# Patient Record
Sex: FEMALE | Race: WHITE | NOT HISPANIC OR LATINO | URBAN - METROPOLITAN AREA
[De-identification: names, ages, dates, MRNs, and addresses within clinical notes are randomized per-mention and may not be internally consistent; named-entity substitution may affect disease eponyms.]

---

## 2024-10-15 ENCOUNTER — LAB (OUTPATIENT)
Dept: LAB | Facility: HOSPITAL | Age: 19
End: 2024-10-15
Payer: OTHER GOVERNMENT

## 2024-10-15 ENCOUNTER — TRANSCRIBE ORDERS (OUTPATIENT)
Dept: LAB | Facility: HOSPITAL | Age: 19
End: 2024-10-15
Payer: OTHER GOVERNMENT

## 2024-10-15 DIAGNOSIS — Z34.82 PRENATAL CARE, SUBSEQUENT PREGNANCY, SECOND TRIMESTER: Primary | ICD-10-CM

## 2024-10-15 DIAGNOSIS — Z34.82 PRENATAL CARE, SUBSEQUENT PREGNANCY, SECOND TRIMESTER: ICD-10-CM

## 2024-10-15 LAB
BLD GP AB SCN SERPL QL: NEGATIVE
DEPRECATED RDW RBC AUTO: 39.9 FL (ref 37–54)
ERYTHROCYTE [DISTWIDTH] IN BLOOD BY AUTOMATED COUNT: 13.6 % (ref 12.3–15.4)
GLUCOSE 1H P 100 G GLC PO SERPL-MCNC: 143 MG/DL (ref 65–139)
HCT VFR BLD AUTO: 29.6 % (ref 34–46.6)
HGB BLD-MCNC: 9.7 G/DL (ref 12–15.9)
MCH RBC QN AUTO: 26.9 PG (ref 26.6–33)
MCHC RBC AUTO-ENTMCNC: 32.8 G/DL (ref 31.5–35.7)
MCV RBC AUTO: 82 FL (ref 79–97)
PLATELET # BLD AUTO: 267 10*3/MM3 (ref 140–450)
PMV BLD AUTO: 11.6 FL (ref 6–12)
RBC # BLD AUTO: 3.61 10*6/MM3 (ref 3.77–5.28)
TREPONEMA PALLIDUM IGG+IGM AB [PRESENCE] IN SERUM OR PLASMA BY IMMUNOASSAY: NORMAL
WBC NRBC COR # BLD AUTO: 9.35 10*3/MM3 (ref 3.4–10.8)

## 2024-10-15 PROCEDURE — 36415 COLL VENOUS BLD VENIPUNCTURE: CPT

## 2024-10-15 PROCEDURE — 82950 GLUCOSE TEST: CPT

## 2024-10-15 PROCEDURE — 82948 REAGENT STRIP/BLOOD GLUCOSE: CPT

## 2024-10-15 PROCEDURE — 86850 RBC ANTIBODY SCREEN: CPT

## 2024-10-15 PROCEDURE — 86780 TREPONEMA PALLIDUM: CPT

## 2024-10-15 PROCEDURE — 85027 COMPLETE CBC AUTOMATED: CPT

## 2024-10-18 ENCOUNTER — TRANSCRIBE ORDERS (OUTPATIENT)
Dept: LAB | Facility: HOSPITAL | Age: 19
End: 2024-10-18
Payer: OTHER GOVERNMENT

## 2024-10-18 ENCOUNTER — LAB (OUTPATIENT)
Dept: LAB | Facility: HOSPITAL | Age: 19
End: 2024-10-18
Payer: OTHER GOVERNMENT

## 2024-10-18 DIAGNOSIS — Z34.93 THIRD TRIMESTER PREGNANCY: Primary | ICD-10-CM

## 2024-10-18 DIAGNOSIS — Z34.93 THIRD TRIMESTER PREGNANCY: ICD-10-CM

## 2024-10-18 DIAGNOSIS — Z3A.29 29 WEEKS GESTATION OF PREGNANCY: ICD-10-CM

## 2024-10-18 LAB
GLUCOSE 1H P 100 G GLC PO SERPL-MCNC: 179 MG/DL (ref 74–180)
GLUCOSE 2H P 100 G GLC PO SERPL-MCNC: 138 MG/DL (ref 74–155)
GLUCOSE 3H P 100 G GLC PO SERPL-MCNC: 116 MG/DL (ref 74–140)
GLUCOSE P FAST SERPL-MCNC: 85 MG/DL (ref 74–106)

## 2024-10-18 PROCEDURE — 82951 GLUCOSE TOLERANCE TEST (GTT): CPT

## 2024-10-18 PROCEDURE — 36415 COLL VENOUS BLD VENIPUNCTURE: CPT

## 2024-10-18 PROCEDURE — 82948 REAGENT STRIP/BLOOD GLUCOSE: CPT

## 2024-10-18 PROCEDURE — 82952 GTT-ADDED SAMPLES: CPT

## 2024-10-29 ENCOUNTER — TRANSCRIBE ORDERS (OUTPATIENT)
Dept: LAB | Facility: HOSPITAL | Age: 19
End: 2024-10-29
Payer: OTHER GOVERNMENT

## 2024-10-29 ENCOUNTER — LAB (OUTPATIENT)
Dept: LAB | Facility: HOSPITAL | Age: 19
End: 2024-10-29
Payer: OTHER GOVERNMENT

## 2024-10-29 DIAGNOSIS — Z34.83 PRENATAL CARE, SUBSEQUENT PREGNANCY, THIRD TRIMESTER: ICD-10-CM

## 2024-10-29 DIAGNOSIS — Z3A.30 30 WEEKS GESTATION OF PREGNANCY: ICD-10-CM

## 2024-10-29 DIAGNOSIS — Z3A.30 30 WEEKS GESTATION OF PREGNANCY: Primary | ICD-10-CM

## 2024-10-29 LAB
ALBUMIN SERPL-MCNC: 3.9 G/DL (ref 3.5–5.2)
ALBUMIN/GLOB SERPL: 1.4 G/DL
ALP SERPL-CCNC: 81 U/L (ref 39–117)
ALT SERPL W P-5'-P-CCNC: 8 U/L (ref 1–33)
ANION GAP SERPL CALCULATED.3IONS-SCNC: 13 MMOL/L (ref 5–15)
AST SERPL-CCNC: 12 U/L (ref 1–32)
BILIRUB SERPL-MCNC: <0.2 MG/DL (ref 0–1.2)
BUN SERPL-MCNC: 5 MG/DL (ref 6–20)
BUN/CREAT SERPL: 10.4 (ref 7–25)
CALCIUM SPEC-SCNC: 9.3 MG/DL (ref 8.6–10.5)
CHLORIDE SERPL-SCNC: 103 MMOL/L (ref 98–107)
CO2 SERPL-SCNC: 22 MMOL/L (ref 22–29)
CREAT SERPL-MCNC: 0.48 MG/DL (ref 0.57–1)
DEPRECATED RDW RBC AUTO: 42.9 FL (ref 37–54)
EGFRCR SERPLBLD CKD-EPI 2021: 138.4 ML/MIN/1.73
ERYTHROCYTE [DISTWIDTH] IN BLOOD BY AUTOMATED COUNT: 14.4 % (ref 12.3–15.4)
GLOBULIN UR ELPH-MCNC: 2.7 GM/DL
GLUCOSE SERPL-MCNC: 86 MG/DL (ref 65–99)
HCT VFR BLD AUTO: 30.4 % (ref 34–46.6)
HGB BLD-MCNC: 9.7 G/DL (ref 12–15.9)
LDH SERPL-CCNC: 194 U/L (ref 135–214)
MCH RBC QN AUTO: 26.4 PG (ref 26.6–33)
MCHC RBC AUTO-ENTMCNC: 31.9 G/DL (ref 31.5–35.7)
MCV RBC AUTO: 82.6 FL (ref 79–97)
PLATELET # BLD AUTO: 259 10*3/MM3 (ref 140–450)
PMV BLD AUTO: 11.8 FL (ref 6–12)
POTASSIUM SERPL-SCNC: 2.9 MMOL/L (ref 3.5–5.2)
PROT SERPL-MCNC: 6.6 G/DL (ref 6–8.5)
RBC # BLD AUTO: 3.68 10*6/MM3 (ref 3.77–5.28)
SODIUM SERPL-SCNC: 138 MMOL/L (ref 136–145)
URATE SERPL-MCNC: 3.6 MG/DL (ref 2.4–5.7)
WBC NRBC COR # BLD AUTO: 11.48 10*3/MM3 (ref 3.4–10.8)

## 2024-10-29 PROCEDURE — 80053 COMPREHEN METABOLIC PANEL: CPT

## 2024-10-29 PROCEDURE — 85027 COMPLETE CBC AUTOMATED: CPT

## 2024-10-29 PROCEDURE — 83615 LACTATE (LD) (LDH) ENZYME: CPT

## 2024-10-29 PROCEDURE — 84156 ASSAY OF PROTEIN URINE: CPT

## 2024-10-29 PROCEDURE — 82570 ASSAY OF URINE CREATININE: CPT

## 2024-10-29 PROCEDURE — 84550 ASSAY OF BLOOD/URIC ACID: CPT

## 2024-10-29 PROCEDURE — 36415 COLL VENOUS BLD VENIPUNCTURE: CPT

## 2024-10-30 LAB
CREAT UR-MCNC: 194.6 MG/DL
PROT ?TM UR-MCNC: 19.6 MG/DL

## 2024-10-31 ENCOUNTER — HOSPITAL ENCOUNTER (OUTPATIENT)
Facility: HOSPITAL | Age: 19
Discharge: HOME | End: 2024-10-31
Attending: ADVANCED PRACTICE MIDWIFE | Admitting: ADVANCED PRACTICE MIDWIFE
Payer: OTHER GOVERNMENT

## 2024-10-31 VITALS
TEMPERATURE: 98.7 F | DIASTOLIC BLOOD PRESSURE: 80 MMHG | BODY MASS INDEX: 32.96 KG/M2 | WEIGHT: 186 LBS | HEIGHT: 63 IN | SYSTOLIC BLOOD PRESSURE: 127 MMHG

## 2024-10-31 LAB
ALBUMIN SERPL-MCNC: 3.8 G/DL (ref 3.5–5.2)
ALBUMIN/GLOB SERPL: 1.3 G/DL
ALP SERPL-CCNC: 77 U/L (ref 39–117)
ALT SERPL W P-5'-P-CCNC: 7 U/L (ref 1–33)
ANION GAP SERPL CALCULATED.3IONS-SCNC: 12 MMOL/L (ref 5–15)
AST SERPL-CCNC: 12 U/L (ref 1–32)
BILIRUB SERPL-MCNC: 0.2 MG/DL (ref 0–1.2)
BUN SERPL-MCNC: 4 MG/DL (ref 6–20)
BUN/CREAT SERPL: 10.8 (ref 7–25)
CALCIUM SPEC-SCNC: 8.5 MG/DL (ref 8.6–10.5)
CHLORIDE SERPL-SCNC: 105 MMOL/L (ref 98–107)
CO2 SERPL-SCNC: 19 MMOL/L (ref 22–29)
CREAT SERPL-MCNC: 0.37 MG/DL (ref 0.57–1)
DEPRECATED RDW RBC AUTO: 46.4 FL (ref 37–54)
EGFRCR SERPLBLD CKD-EPI 2021: 147.4 ML/MIN/1.73
ERYTHROCYTE [DISTWIDTH] IN BLOOD BY AUTOMATED COUNT: 15.7 % (ref 12.3–15.4)
GLOBULIN UR ELPH-MCNC: 2.9 GM/DL
GLUCOSE SERPL-MCNC: 70 MG/DL (ref 65–99)
HCT VFR BLD AUTO: 33.6 % (ref 34–46.6)
HGB BLD-MCNC: 10.9 G/DL (ref 12–15.9)
MCH RBC QN AUTO: 26.7 PG (ref 26.6–33)
MCHC RBC AUTO-ENTMCNC: 32.4 G/DL (ref 31.5–35.7)
MCV RBC AUTO: 82.4 FL (ref 79–97)
PLATELET # BLD AUTO: 256 10*3/MM3 (ref 140–450)
PMV BLD AUTO: 11.4 FL (ref 6–12)
POTASSIUM SERPL-SCNC: 3.6 MMOL/L (ref 3.5–5.2)
PROT SERPL-MCNC: 6.7 G/DL (ref 6–8.5)
RBC # BLD AUTO: 4.08 10*6/MM3 (ref 3.77–5.28)
SODIUM SERPL-SCNC: 136 MMOL/L (ref 136–145)
URATE SERPL-MCNC: 3.1 MG/DL (ref 2.4–5.7)
WBC NRBC COR # BLD AUTO: 11.68 10*3/MM3 (ref 3.4–10.8)

## 2024-10-31 PROCEDURE — G0463 HOSPITAL OUTPT CLINIC VISIT: HCPCS

## 2024-10-31 PROCEDURE — 85027 COMPLETE CBC AUTOMATED: CPT | Performed by: ADVANCED PRACTICE MIDWIFE

## 2024-10-31 PROCEDURE — 80053 COMPREHEN METABOLIC PANEL: CPT | Performed by: ADVANCED PRACTICE MIDWIFE

## 2024-10-31 PROCEDURE — 59025 FETAL NON-STRESS TEST: CPT

## 2024-10-31 PROCEDURE — 84550 ASSAY OF BLOOD/URIC ACID: CPT | Performed by: ADVANCED PRACTICE MIDWIFE

## 2024-10-31 PROCEDURE — 36415 COLL VENOUS BLD VENIPUNCTURE: CPT | Performed by: ADVANCED PRACTICE MIDWIFE

## 2024-10-31 RX ORDER — FERROUS SULFATE 325(65) MG
325 TABLET ORAL
COMMUNITY

## 2024-10-31 RX ORDER — PRENATAL WITH FERROUS FUM AND FOLIC ACID 3080; 920; 120; 400; 22; 1.84; 3; 20; 10; 1; 12; 200; 27; 25; 2 [IU]/1; [IU]/1; MG/1; [IU]/1; MG/1; MG/1; MG/1; MG/1; MG/1; MG/1; UG/1; MG/1; MG/1; MG/1; MG/1
1 TABLET ORAL DAILY
COMMUNITY

## 2024-10-31 RX ORDER — BUSPIRONE HYDROCHLORIDE 10 MG/1
10 TABLET ORAL 3 TIMES DAILY
COMMUNITY

## 2024-10-31 NOTE — H&P
TAMMY Agarwal  Obstetric History and Physical    CC: BP evaluation    SUBJECTIVE:     Patient is a 19 y.o. female  currently at 30w4d, who presents from the office with multiple complaints.   Reports several days of having intermittent dizziness and pre-syncope. BP at home mildly elevated but normal in office. Was seen 10/29 with similar complaints also very anxious. Started on buspar at that visit.   Labs drawn 10/29 did show low potassium but she took the tablets for repletion already.   Denies HA, abdominal pain.   Episodes are intermittent, mostly when she is sitting down or changing positions       Prenatal Information:  Prenatal Results       Initial Prenatal Labs       Test Value Reference Range Date Time    Hemoglobin        Hematocrit        Platelets        Rubella IgG        Hepatitis B SAg        Hepatitis C Ab        RPR        T. Pallidum Ab   Non-Reactive  Non-Reactive 10/15/24 1526    ABO        Rh        Antibody Screen        HIV        Urine Culture        Gonorrhea        Chlamydia        TSH        HgB A1c         Varicella IgG        Hemoglobinopathy Fractionation        Hemoglobinopathy (genetic testing)        Cystic fibrosis                   Fetal testing        Test Value Reference Range Date Time    NIPT        MSAFP        AFP-4                  2nd and 3rd Trimester       Test Value Reference Range Date Time    Hemoglobin (repeated)  10.9 g/dL 12.0 - 15.9 10/31/24 1658       9.7 g/dL 12.0 - 15.9 10/29/24 1436       9.7 g/dL 12.0 - 15.9 10/15/24 1526    Hematocrit (repeated)  33.6 % 34.0 - 46.6 10/31/24 1658       30.4 % 34.0 - 46.6 10/29/24 1436       29.6 % 34.0 - 46.6 10/15/24 1526    Platelets   256 10*3/mm3 140 - 450 10/31/24 1658       259 10*3/mm3 140 - 450 10/29/24 1436       267 10*3/mm3 140 - 450 10/15/24 1526    1 hour GTT   179 mg/dL 74 - 180 10/18/24 1012       143 mg/dL 65 - 139 10/15/24 1526    Antibody Screen (repeated)  Negative   10/15/24 1526    3rd TM syphilis  scrn (repeated)  RPR         3rd TM syphilis scrn (repeated) TP-Ab  Non-Reactive  Non-Reactive 10/15/24 1526    3rd TM syphilis screen TB-Ab (FTA)  Non-Reactive  Non-Reactive 10/15/24 1526    Syphilis cascade test TP-Ab (EIA)        Syphilis cascade TPPA        GTT Fasting        GTT 1 Hr        GTT 2 Hr        GTT 3 Hr  116 mg/dL 74 - 140 10/18/24 1241    Group B Strep                  Other testing        Test Value Reference Range Date Time    Parvo IgG         CMV IgG                   Drug Screening       Test Value Reference Range Date Time    Amphetamine Screen        Barbiturate Screen        Benzodiazepine Screen        Methadone Screen        Phencyclidine Screen        Opiates Screen        THC Screen        Cocaine Screen        Propoxyphene Screen        Buprenorphine Screen        Methamphetamine Screen        Oxycodone Screen        Tricyclic Antidepressants Screen                  Legend    ^: Historical                          External Prenatal Results       Pregnancy Outside Results - Transcribed From Office Records - See Scanned Records For Details       Test Value Date Time    ABO       Rh       Antibody Screen  Negative  10/15/24 1526    Varicella IgG       Rubella       Hgb  10.9 g/dL 10/31/24 1658       9.7 g/dL 10/29/24 1436       9.7 g/dL 10/15/24 1526    Hct  33.6 % 10/31/24 1658       30.4 % 10/29/24 1436       29.6 % 10/15/24 1526    HgB A1c        1h GTT  179 mg/dL 10/18/24 1012       143 mg/dL 10/15/24 1526    3h GTT Fasting       3h GTT 1 hour       3h GTT 2 hour       3h GTT 3 hour   116 mg/dL 10/18/24 1241    Gonorrhea (discrete)       Chlamydia (discrete)       RPR       Syphils cascade: TP-Ab (FTA)  Non-Reactive  10/15/24 1526    TP-Ab  Non-Reactive  10/15/24 1526    TP-Ab (EIA)       TPPA       HBsAg       Herpes Simplex Virus PCR       Herpes Simplex VIrus Culture       HIV       Hep C RNA Quant PCR       Hep C Antibody       AFP       NIPT       Cystic Fibroisis        Group B  Strep       GBS Susceptibility to Clindamycin       GBS Susceptibility to Erythromycin       Fetal Fibronectin       Genetic Testing, Maternal Blood                 Drug Screening       Test Value Date Time    Urine Drug Screen       Amphetamine Screen       Barbiturate Screen       Benzodiazepine Screen       Methadone Screen       Phencyclidine Screen       Opiates Screen       THC Screen       Cocaine Screen       Propoxyphene Screen       Buprenorphine Screen       Methamphetamine Screen       Oxycodone Screen       Tricyclic Antidepressants Screen                 Legend    ^: Historical                             OB History:                     OB History    Para Term  AB Living   2 0 0 0 1 0   SAB IAB Ectopic Molar Multiple Live Births   1 0 0 0 0 0      # Outcome Date GA Lbr Jewel/2nd Weight Sex Type Anes PTL Lv   2 Current            1 SAB 2024 9w0d             Allergies:                      No Known Allergies   Past Medical History: Past Medical History:   Diagnosis Date    Anxiety       Past Surgical History No past surgical history on file.   Family History: No family history on file.   Social History:  reports that she quit smoking about 10 months ago. Her smoking use included cigarettes. She has never used smokeless tobacco.   reports that she does not currently use alcohol.   reports no history of drug use.    General ROS: Pertinent items are noted in HPI, all other systems reviewed and negative   Medications: Prenatal 27-1, busPIRone, and ferrous sulfate       Objective       Vital Signs Range for the last 24 hours  Temperature: Temp:  [98.7 °F (37.1 °C)] 98.7 °F (37.1 °C)   BP: BP: (126-137)/(84-87) 126/84   Pulse:     Respirations:     SPO2:                 Physical Examination: General appearance - alert, well appearing, and in no distress  Chest - clear to auscultation, no wheezes, rales or rhonchi, symmetric air entry  Heart - normal rate, regular rhythm, normal S1, S2, no  murmurs, rubs, clicks or gallops  Abdomen - Soft, gravid, nontender  Extremities - peripheral pulses normal, no pedal edema, no clubbing or cyanosis, no pedal edema noted      Fetal Heart Rate Assessment           Baseline: Fetal HR Baseline: normal range   Variability: Fetal HR Variability: moderate (amplitude range 6 to 25 bpm)   Accels: Fetal HR Accelerations: greater than/equal to 15 bpm, lasting at least 15 seconds   Decels: Fetal HR Decelerations: absent   Tracing Category:       Uterine Assessment   Method: Method: external tocotransducer   Frequency (min):     Ctx Count in 10 min:       Laboratory Results:  CBC:      Lab 10/31/24  1658 10/29/24  1436   WBC 11.68* 11.48*   HEMOGLOBIN 10.9* 9.7*   HEMATOCRIT 33.6* 30.4*   PLATELETS 256 259   MCV 82.4 82.6      CMP:        Lab 10/31/24  1658 10/29/24  1436   SODIUM 136 138   POTASSIUM 3.6 2.9*   CHLORIDE 105 103   CO2 19.0* 22.0   ANION GAP 12.0 13.0   BUN 4* 5*   CREATININE 0.37* 0.48*   EGFR 147.4 138.4   GLUCOSE 70 86   CALCIUM 8.5* 9.3   TOTAL PROTEIN 6.7 6.6   ALBUMIN 3.8 3.9   GLOBULIN 2.9 2.7   ALT (SGPT) 7 8   AST (SGOT) 12 12   BILIRUBIN 0.2 <0.2   ALK PHOS 77 81            Assessment/Plan:   IUP 30w4d     1.no evidence of pre-e or HTN  2.pre-syncope, dizziness  -likely related to orthostatic hypotension and changes in pregnancy physiology.   -BP normal in triage. Labs normal  -discussed hydration, hydration packets etc  3.Hypo K resolved.   4. FWB reassuring  5. Dc home with precautions. RTC as scheduled      Daiana Silva MD  10/31/2024  17:31 EDT

## 2025-01-01 RX ORDER — BUTORPHANOL TARTRATE 1 MG/ML
1 INJECTION, SOLUTION INTRAMUSCULAR; INTRAVENOUS
Status: CANCELLED | OUTPATIENT
Start: 2025-01-01

## 2025-01-01 RX ORDER — CARBOPROST TROMETHAMINE 250 UG/ML
250 INJECTION, SOLUTION INTRAMUSCULAR AS NEEDED
Status: CANCELLED | OUTPATIENT
Start: 2025-01-01

## 2025-01-01 RX ORDER — IBUPROFEN 600 MG/1
600 TABLET, FILM COATED ORAL EVERY 6 HOURS PRN
Status: CANCELLED | OUTPATIENT
Start: 2025-01-01

## 2025-01-01 RX ORDER — LIDOCAINE HYDROCHLORIDE 10 MG/ML
0.5 INJECTION, SOLUTION EPIDURAL; INFILTRATION; INTRACAUDAL; PERINEURAL ONCE AS NEEDED
Status: CANCELLED | OUTPATIENT
Start: 2025-01-01

## 2025-01-01 RX ORDER — ONDANSETRON 2 MG/ML
4 INJECTION INTRAMUSCULAR; INTRAVENOUS EVERY 6 HOURS PRN
Status: CANCELLED | OUTPATIENT
Start: 2025-01-01

## 2025-01-01 RX ORDER — OXYTOCIN/0.9 % SODIUM CHLORIDE 30/500 ML
250 PLASTIC BAG, INJECTION (ML) INTRAVENOUS CONTINUOUS
Status: CANCELLED | OUTPATIENT
Start: 2025-01-01 | End: 2025-01-01

## 2025-01-01 RX ORDER — BUTORPHANOL TARTRATE 2 MG/ML
2 INJECTION, SOLUTION INTRAMUSCULAR; INTRAVENOUS
Status: CANCELLED | OUTPATIENT
Start: 2025-01-01

## 2025-01-01 RX ORDER — DIPHENHYDRAMINE HYDROCHLORIDE 50 MG/ML
25 INJECTION INTRAMUSCULAR; INTRAVENOUS NIGHTLY PRN
Status: CANCELLED | OUTPATIENT
Start: 2025-01-01

## 2025-01-01 RX ORDER — ACETAMINOPHEN 325 MG/1
650 TABLET ORAL EVERY 4 HOURS PRN
Status: CANCELLED | OUTPATIENT
Start: 2025-01-01

## 2025-01-01 RX ORDER — SODIUM CHLORIDE, SODIUM LACTATE, POTASSIUM CHLORIDE, CALCIUM CHLORIDE 600; 310; 30; 20 MG/100ML; MG/100ML; MG/100ML; MG/100ML
75 INJECTION, SOLUTION INTRAVENOUS CONTINUOUS
Status: CANCELLED | OUTPATIENT
Start: 2025-01-01 | End: 2025-01-02

## 2025-01-01 RX ORDER — TERBUTALINE SULFATE 1 MG/ML
0.25 INJECTION, SOLUTION SUBCUTANEOUS AS NEEDED
Status: CANCELLED | OUTPATIENT
Start: 2025-01-01

## 2025-01-01 RX ORDER — SODIUM CHLORIDE 0.9 % (FLUSH) 0.9 %
10 SYRINGE (ML) INJECTION AS NEEDED
Status: CANCELLED | OUTPATIENT
Start: 2025-01-01

## 2025-01-01 RX ORDER — MISOPROSTOL 200 UG/1
800 TABLET ORAL AS NEEDED
Status: CANCELLED | OUTPATIENT
Start: 2025-01-01

## 2025-01-01 RX ORDER — FAMOTIDINE 20 MG/1
20 TABLET, FILM COATED ORAL EVERY 12 HOURS PRN
Status: CANCELLED | OUTPATIENT
Start: 2025-01-01

## 2025-01-01 RX ORDER — PENICILLIN G 3000000 [IU]/50ML
3 INJECTION, SOLUTION INTRAVENOUS EVERY 4 HOURS
Status: CANCELLED | OUTPATIENT
Start: 2025-01-01 | End: 2025-01-02

## 2025-01-01 RX ORDER — OXYTOCIN/0.9 % SODIUM CHLORIDE 30/500 ML
2-24 PLASTIC BAG, INJECTION (ML) INTRAVENOUS
Status: CANCELLED | OUTPATIENT
Start: 2025-01-03

## 2025-01-01 RX ORDER — DIPHENHYDRAMINE HCL 25 MG
25 CAPSULE ORAL NIGHTLY PRN
Status: CANCELLED | OUTPATIENT
Start: 2025-01-01

## 2025-01-01 RX ORDER — METHYLERGONOVINE MALEATE 0.2 MG/ML
200 INJECTION INTRAVENOUS ONCE AS NEEDED
Status: CANCELLED | OUTPATIENT
Start: 2025-01-01

## 2025-01-01 RX ORDER — SODIUM CHLORIDE 9 MG/ML
40 INJECTION, SOLUTION INTRAVENOUS AS NEEDED
Status: CANCELLED | OUTPATIENT
Start: 2025-01-01

## 2025-01-01 RX ORDER — OXYTOCIN/0.9 % SODIUM CHLORIDE 30/500 ML
999 PLASTIC BAG, INJECTION (ML) INTRAVENOUS ONCE
Status: CANCELLED | OUTPATIENT
Start: 2025-01-01

## 2025-01-01 RX ORDER — MAGNESIUM CARB/ALUMINUM HYDROX 105-160MG
30 TABLET,CHEWABLE ORAL ONCE
Status: CANCELLED | OUTPATIENT
Start: 2025-01-01 | End: 2025-01-01

## 2025-01-01 RX ORDER — ONDANSETRON 4 MG/1
4 TABLET, ORALLY DISINTEGRATING ORAL EVERY 6 HOURS PRN
Status: CANCELLED | OUTPATIENT
Start: 2025-01-01

## 2025-01-01 RX ORDER — SODIUM CHLORIDE 0.9 % (FLUSH) 0.9 %
10 SYRINGE (ML) INJECTION EVERY 12 HOURS SCHEDULED
Status: CANCELLED | OUTPATIENT
Start: 2025-01-01

## 2025-01-02 ENCOUNTER — HOSPITAL ENCOUNTER (INPATIENT)
Facility: HOSPITAL | Age: 20
LOS: 2 days | Discharge: HOME | End: 2025-01-05
Attending: ADVANCED PRACTICE MIDWIFE | Admitting: OBSTETRICS & GYNECOLOGY
Payer: OTHER GOVERNMENT

## 2025-01-02 ENCOUNTER — HOSPITAL ENCOUNTER (OUTPATIENT)
Dept: LABOR AND DELIVERY | Facility: HOSPITAL | Age: 20
Discharge: HOME | End: 2025-01-02
Payer: OTHER GOVERNMENT

## 2025-01-02 LAB
ABO GROUP BLD: NORMAL
BLD GP AB SCN SERPL QL: NEGATIVE
DEPRECATED RDW RBC AUTO: 42.5 FL (ref 37–54)
ERYTHROCYTE [DISTWIDTH] IN BLOOD BY AUTOMATED COUNT: 14.5 % (ref 12.3–15.4)
HCT VFR BLD AUTO: 32.5 % (ref 34–46.6)
HGB BLD-MCNC: 11.2 G/DL (ref 12–15.9)
MCH RBC QN AUTO: 28 PG (ref 26.6–33)
MCHC RBC AUTO-ENTMCNC: 34.5 G/DL (ref 31.5–35.7)
MCV RBC AUTO: 81.3 FL (ref 79–97)
PLATELET # BLD AUTO: 251 10*3/MM3 (ref 140–450)
PMV BLD AUTO: 11.1 FL (ref 6–12)
RBC # BLD AUTO: 4 10*6/MM3 (ref 3.77–5.28)
RH BLD: POSITIVE
T&S EXPIRATION DATE: NORMAL
WBC NRBC COR # BLD AUTO: 11.21 10*3/MM3 (ref 3.4–10.8)

## 2025-01-02 PROCEDURE — 25810000003 LACTATED RINGERS PER 1000 ML: Performed by: ADVANCED PRACTICE MIDWIFE

## 2025-01-02 PROCEDURE — 86900 BLOOD TYPING SEROLOGIC ABO: CPT | Performed by: ADVANCED PRACTICE MIDWIFE

## 2025-01-02 PROCEDURE — 86901 BLOOD TYPING SEROLOGIC RH(D): CPT | Performed by: ADVANCED PRACTICE MIDWIFE

## 2025-01-02 PROCEDURE — 86901 BLOOD TYPING SEROLOGIC RH(D): CPT

## 2025-01-02 PROCEDURE — 59025 FETAL NON-STRESS TEST: CPT

## 2025-01-02 PROCEDURE — 86780 TREPONEMA PALLIDUM: CPT | Performed by: ADVANCED PRACTICE MIDWIFE

## 2025-01-02 PROCEDURE — 25010000002 PENICILLIN G POTASSIUM PER 600000 UNITS: Performed by: ADVANCED PRACTICE MIDWIFE

## 2025-01-02 PROCEDURE — 85027 COMPLETE CBC AUTOMATED: CPT | Performed by: ADVANCED PRACTICE MIDWIFE

## 2025-01-02 PROCEDURE — 86900 BLOOD TYPING SEROLOGIC ABO: CPT

## 2025-01-02 PROCEDURE — 86850 RBC ANTIBODY SCREEN: CPT | Performed by: ADVANCED PRACTICE MIDWIFE

## 2025-01-02 RX ORDER — MAGNESIUM CARB/ALUMINUM HYDROX 105-160MG
30 TABLET,CHEWABLE ORAL ONCE
Status: DISCONTINUED | OUTPATIENT
Start: 2025-01-02 | End: 2025-01-03 | Stop reason: HOSPADM

## 2025-01-02 RX ORDER — BUTORPHANOL TARTRATE 2 MG/ML
2 INJECTION, SOLUTION INTRAMUSCULAR; INTRAVENOUS
Status: DISCONTINUED | OUTPATIENT
Start: 2025-01-02 | End: 2025-01-03 | Stop reason: HOSPADM

## 2025-01-02 RX ORDER — ONDANSETRON 4 MG/1
4 TABLET, ORALLY DISINTEGRATING ORAL EVERY 6 HOURS PRN
Status: DISCONTINUED | OUTPATIENT
Start: 2025-01-02 | End: 2025-01-03 | Stop reason: HOSPADM

## 2025-01-02 RX ORDER — FAMOTIDINE 20 MG/1
20 TABLET, FILM COATED ORAL EVERY 12 HOURS PRN
Status: DISCONTINUED | OUTPATIENT
Start: 2025-01-02 | End: 2025-01-03 | Stop reason: HOSPADM

## 2025-01-02 RX ORDER — SODIUM CHLORIDE 9 MG/ML
40 INJECTION, SOLUTION INTRAVENOUS AS NEEDED
Status: DISCONTINUED | OUTPATIENT
Start: 2025-01-02 | End: 2025-01-03 | Stop reason: HOSPADM

## 2025-01-02 RX ORDER — TERBUTALINE SULFATE 1 MG/ML
0.25 INJECTION, SOLUTION SUBCUTANEOUS AS NEEDED
Status: DISCONTINUED | OUTPATIENT
Start: 2025-01-02 | End: 2025-01-03 | Stop reason: HOSPADM

## 2025-01-02 RX ORDER — FAMOTIDINE 10 MG/ML
20 INJECTION, SOLUTION INTRAVENOUS EVERY 12 HOURS PRN
Status: DISCONTINUED | OUTPATIENT
Start: 2025-01-02 | End: 2025-01-03 | Stop reason: HOSPADM

## 2025-01-02 RX ORDER — OXYTOCIN/0.9 % SODIUM CHLORIDE 30/500 ML
2-24 PLASTIC BAG, INJECTION (ML) INTRAVENOUS
Status: DISCONTINUED | OUTPATIENT
Start: 2025-01-03 | End: 2025-01-02

## 2025-01-02 RX ORDER — BUTORPHANOL TARTRATE 1 MG/ML
1 INJECTION, SOLUTION INTRAMUSCULAR; INTRAVENOUS
Status: DISCONTINUED | OUTPATIENT
Start: 2025-01-02 | End: 2025-01-03 | Stop reason: HOSPADM

## 2025-01-02 RX ORDER — SODIUM CHLORIDE, SODIUM LACTATE, POTASSIUM CHLORIDE, CALCIUM CHLORIDE 600; 310; 30; 20 MG/100ML; MG/100ML; MG/100ML; MG/100ML
75 INJECTION, SOLUTION INTRAVENOUS CONTINUOUS
Status: ACTIVE | OUTPATIENT
Start: 2025-01-02 | End: 2025-01-03

## 2025-01-02 RX ORDER — PENICILLIN G 3000000 [IU]/50ML
3 INJECTION, SOLUTION INTRAVENOUS EVERY 4 HOURS
Status: DISCONTINUED | OUTPATIENT
Start: 2025-01-03 | End: 2025-01-03 | Stop reason: HOSPADM

## 2025-01-02 RX ORDER — EPHEDRINE SULFATE 5 MG/ML
INJECTION INTRAVENOUS
Status: DISCONTINUED
Start: 2025-01-02 | End: 2025-01-03 | Stop reason: WASHOUT

## 2025-01-02 RX ORDER — DIPHENHYDRAMINE HYDROCHLORIDE 50 MG/ML
25 INJECTION INTRAMUSCULAR; INTRAVENOUS NIGHTLY PRN
Status: DISCONTINUED | OUTPATIENT
Start: 2025-01-02 | End: 2025-01-03 | Stop reason: HOSPADM

## 2025-01-02 RX ORDER — LIDOCAINE HYDROCHLORIDE 10 MG/ML
0.5 INJECTION, SOLUTION EPIDURAL; INFILTRATION; INTRACAUDAL; PERINEURAL ONCE AS NEEDED
Status: DISCONTINUED | OUTPATIENT
Start: 2025-01-02 | End: 2025-01-03 | Stop reason: HOSPADM

## 2025-01-02 RX ORDER — DIPHENHYDRAMINE HCL 25 MG
25 CAPSULE ORAL NIGHTLY PRN
Status: DISCONTINUED | OUTPATIENT
Start: 2025-01-02 | End: 2025-01-03 | Stop reason: HOSPADM

## 2025-01-02 RX ORDER — SODIUM CHLORIDE 0.9 % (FLUSH) 0.9 %
10 SYRINGE (ML) INJECTION EVERY 12 HOURS SCHEDULED
Status: DISCONTINUED | OUTPATIENT
Start: 2025-01-02 | End: 2025-01-03 | Stop reason: HOSPADM

## 2025-01-02 RX ORDER — OXYTOCIN/0.9 % SODIUM CHLORIDE 30/500 ML
2-24 PLASTIC BAG, INJECTION (ML) INTRAVENOUS
Status: DISCONTINUED | OUTPATIENT
Start: 2025-01-02 | End: 2025-01-03 | Stop reason: HOSPADM

## 2025-01-02 RX ORDER — SODIUM CHLORIDE 0.9 % (FLUSH) 0.9 %
10 SYRINGE (ML) INJECTION AS NEEDED
Status: DISCONTINUED | OUTPATIENT
Start: 2025-01-02 | End: 2025-01-03 | Stop reason: HOSPADM

## 2025-01-02 RX ORDER — ONDANSETRON 2 MG/ML
4 INJECTION INTRAMUSCULAR; INTRAVENOUS EVERY 6 HOURS PRN
Status: DISCONTINUED | OUTPATIENT
Start: 2025-01-02 | End: 2025-01-03 | Stop reason: HOSPADM

## 2025-01-02 RX ORDER — ACETAMINOPHEN 325 MG/1
650 TABLET ORAL EVERY 4 HOURS PRN
Status: DISCONTINUED | OUTPATIENT
Start: 2025-01-02 | End: 2025-01-03 | Stop reason: HOSPADM

## 2025-01-02 RX ADMIN — Medication 2 MILLI-UNITS/MIN: at 22:30

## 2025-01-02 RX ADMIN — SODIUM CHLORIDE 5 MILLION UNITS: 900 INJECTION INTRAVENOUS at 21:01

## 2025-01-02 RX ADMIN — SODIUM CHLORIDE, POTASSIUM CHLORIDE, SODIUM LACTATE AND CALCIUM CHLORIDE 75 ML/HR: 600; 310; 30; 20 INJECTION, SOLUTION INTRAVENOUS at 20:47

## 2025-01-03 ENCOUNTER — ANESTHESIA EVENT (OUTPATIENT)
Dept: LABOR AND DELIVERY | Facility: HOSPITAL | Age: 20
End: 2025-01-03
Payer: OTHER GOVERNMENT

## 2025-01-03 ENCOUNTER — ANESTHESIA (OUTPATIENT)
Dept: LABOR AND DELIVERY | Facility: HOSPITAL | Age: 20
End: 2025-01-03
Payer: OTHER GOVERNMENT

## 2025-01-03 PROBLEM — Z3A.39 39 WEEKS GESTATION OF PREGNANCY: Status: RESOLVED | Noted: 2025-01-03 | Resolved: 2025-01-03

## 2025-01-03 PROBLEM — Z3A.39 39 WEEKS GESTATION OF PREGNANCY: Status: ACTIVE | Noted: 2025-01-03

## 2025-01-03 LAB
ABO GROUP BLD: NORMAL
RH BLD: POSITIVE
TREPONEMA PALLIDUM IGG+IGM AB [PRESENCE] IN SERUM OR PLASMA BY IMMUNOASSAY: NORMAL

## 2025-01-03 PROCEDURE — 25010000002 ROPIVACAINE PER 1 MG: Performed by: ANESTHESIOLOGY

## 2025-01-03 PROCEDURE — 25810000003 LACTATED RINGERS PER 1000 ML: Performed by: ADVANCED PRACTICE MIDWIFE

## 2025-01-03 PROCEDURE — 3E033VJ INTRODUCTION OF OTHER HORMONE INTO PERIPHERAL VEIN, PERCUTANEOUS APPROACH: ICD-10-PCS | Performed by: ADVANCED PRACTICE MIDWIFE

## 2025-01-03 PROCEDURE — 51703 INSERT BLADDER CATH COMPLEX: CPT

## 2025-01-03 PROCEDURE — 25010000002 LIDOCAINE-EPINEPHRINE (PF) 1.5 %-1:200000 SOLUTION: Performed by: ANESTHESIOLOGY

## 2025-01-03 PROCEDURE — 25010000002 FENTANYL CITRATE (PF) 50 MCG/ML SOLUTION: Performed by: ANESTHESIOLOGY

## 2025-01-03 PROCEDURE — C1755 CATHETER, INTRASPINAL: HCPCS | Performed by: ANESTHESIOLOGY

## 2025-01-03 PROCEDURE — 25010000002 PENICILLIN G POTASSIUM PER 600000 UNITS: Performed by: ADVANCED PRACTICE MIDWIFE

## 2025-01-03 PROCEDURE — C1755 CATHETER, INTRASPINAL: HCPCS

## 2025-01-03 PROCEDURE — 25010000002 ONDANSETRON PER 1 MG: Performed by: ADVANCED PRACTICE MIDWIFE

## 2025-01-03 PROCEDURE — 0KQM0ZZ REPAIR PERINEUM MUSCLE, OPEN APPROACH: ICD-10-PCS | Performed by: ADVANCED PRACTICE MIDWIFE

## 2025-01-03 PROCEDURE — 25010000002 BUTORPHANOL PER 1 MG: Performed by: ADVANCED PRACTICE MIDWIFE

## 2025-01-03 PROCEDURE — 25810000003 LACTATED RINGERS SOLUTION: Performed by: ANESTHESIOLOGY

## 2025-01-03 RX ORDER — HYDROCORTISONE 25 MG/G
1 CREAM TOPICAL AS NEEDED
Status: DISCONTINUED | OUTPATIENT
Start: 2025-01-03 | End: 2025-01-05 | Stop reason: HOSPADM

## 2025-01-03 RX ORDER — OXYTOCIN/0.9 % SODIUM CHLORIDE 30/500 ML
250 PLASTIC BAG, INJECTION (ML) INTRAVENOUS CONTINUOUS
Status: ACTIVE | OUTPATIENT
Start: 2025-01-03 | End: 2025-01-03

## 2025-01-03 RX ORDER — LIDOCAINE HYDROCHLORIDE AND EPINEPHRINE 15; 5 MG/ML; UG/ML
INJECTION, SOLUTION EPIDURAL AS NEEDED
Status: DISCONTINUED | OUTPATIENT
Start: 2025-01-03 | End: 2025-01-03 | Stop reason: SURG

## 2025-01-03 RX ORDER — ACETAMINOPHEN 325 MG/1
650 TABLET ORAL EVERY 6 HOURS PRN
Status: DISCONTINUED | OUTPATIENT
Start: 2025-01-03 | End: 2025-01-05 | Stop reason: HOSPADM

## 2025-01-03 RX ORDER — IBUPROFEN 600 MG/1
600 TABLET, FILM COATED ORAL EVERY 6 HOURS PRN
Status: DISCONTINUED | OUTPATIENT
Start: 2025-01-03 | End: 2025-01-03 | Stop reason: HOSPADM

## 2025-01-03 RX ORDER — HYDROCODONE BITARTRATE AND ACETAMINOPHEN 5; 325 MG/1; MG/1
1 TABLET ORAL EVERY 4 HOURS PRN
Status: DISCONTINUED | OUTPATIENT
Start: 2025-01-03 | End: 2025-01-05 | Stop reason: HOSPADM

## 2025-01-03 RX ORDER — ROPIVACAINE HYDROCHLORIDE 2 MG/ML
15 INJECTION, SOLUTION EPIDURAL; INFILTRATION; PERINEURAL CONTINUOUS
Status: DISCONTINUED | OUTPATIENT
Start: 2025-01-03 | End: 2025-01-04

## 2025-01-03 RX ORDER — PRENATAL VIT/IRON FUM/FOLIC AC 27MG-0.8MG
1 TABLET ORAL DAILY
Status: DISCONTINUED | OUTPATIENT
Start: 2025-01-03 | End: 2025-01-05 | Stop reason: HOSPADM

## 2025-01-03 RX ORDER — EPHEDRINE SULFATE 5 MG/ML
10 INJECTION INTRAVENOUS
Status: DISCONTINUED | OUTPATIENT
Start: 2025-01-03 | End: 2025-01-03 | Stop reason: HOSPADM

## 2025-01-03 RX ORDER — DOCUSATE SODIUM 100 MG/1
100 CAPSULE, LIQUID FILLED ORAL 2 TIMES DAILY
Status: DISCONTINUED | OUTPATIENT
Start: 2025-01-03 | End: 2025-01-05 | Stop reason: HOSPADM

## 2025-01-03 RX ORDER — DIPHENHYDRAMINE HYDROCHLORIDE 50 MG/ML
12.5 INJECTION INTRAMUSCULAR; INTRAVENOUS EVERY 8 HOURS PRN
Status: DISCONTINUED | OUTPATIENT
Start: 2025-01-03 | End: 2025-01-03 | Stop reason: HOSPADM

## 2025-01-03 RX ORDER — ROPIVACAINE HYDROCHLORIDE 5 MG/ML
INJECTION, SOLUTION EPIDURAL; INFILTRATION; PERINEURAL AS NEEDED
Status: DISCONTINUED | OUTPATIENT
Start: 2025-01-03 | End: 2025-01-03 | Stop reason: SURG

## 2025-01-03 RX ORDER — FENTANYL CITRATE 50 UG/ML
INJECTION, SOLUTION INTRAMUSCULAR; INTRAVENOUS AS NEEDED
Status: DISCONTINUED | OUTPATIENT
Start: 2025-01-03 | End: 2025-01-03 | Stop reason: SURG

## 2025-01-03 RX ORDER — CARBOPROST TROMETHAMINE 250 UG/ML
250 INJECTION, SOLUTION INTRAMUSCULAR AS NEEDED
Status: DISCONTINUED | OUTPATIENT
Start: 2025-01-03 | End: 2025-01-03 | Stop reason: HOSPADM

## 2025-01-03 RX ORDER — FENTANYL CITRATE 50 UG/ML
INJECTION, SOLUTION INTRAMUSCULAR; INTRAVENOUS
Status: COMPLETED
Start: 2025-01-03 | End: 2025-01-03

## 2025-01-03 RX ORDER — OXYTOCIN/0.9 % SODIUM CHLORIDE 30/500 ML
125 PLASTIC BAG, INJECTION (ML) INTRAVENOUS ONCE AS NEEDED
Status: DISCONTINUED | OUTPATIENT
Start: 2025-01-03 | End: 2025-01-05 | Stop reason: HOSPADM

## 2025-01-03 RX ORDER — ONDANSETRON 2 MG/ML
4 INJECTION INTRAMUSCULAR; INTRAVENOUS EVERY 6 HOURS PRN
Status: DISCONTINUED | OUTPATIENT
Start: 2025-01-03 | End: 2025-01-05 | Stop reason: HOSPADM

## 2025-01-03 RX ORDER — METOCLOPRAMIDE HYDROCHLORIDE 5 MG/ML
10 INJECTION INTRAMUSCULAR; INTRAVENOUS ONCE AS NEEDED
Status: DISCONTINUED | OUTPATIENT
Start: 2025-01-03 | End: 2025-01-03 | Stop reason: HOSPADM

## 2025-01-03 RX ORDER — BISACODYL 10 MG
10 SUPPOSITORY, RECTAL RECTAL DAILY PRN
Status: DISCONTINUED | OUTPATIENT
Start: 2025-01-04 | End: 2025-01-05 | Stop reason: HOSPADM

## 2025-01-03 RX ORDER — ONDANSETRON 2 MG/ML
4 INJECTION INTRAMUSCULAR; INTRAVENOUS ONCE AS NEEDED
Status: DISCONTINUED | OUTPATIENT
Start: 2025-01-03 | End: 2025-01-03 | Stop reason: HOSPADM

## 2025-01-03 RX ORDER — IBUPROFEN 600 MG/1
600 TABLET, FILM COATED ORAL EVERY 6 HOURS PRN
Status: DISCONTINUED | OUTPATIENT
Start: 2025-01-03 | End: 2025-01-05 | Stop reason: HOSPADM

## 2025-01-03 RX ORDER — SIMETHICONE 80 MG
80 TABLET,CHEWABLE ORAL 4 TIMES DAILY PRN
Status: DISCONTINUED | OUTPATIENT
Start: 2025-01-03 | End: 2025-01-04

## 2025-01-03 RX ORDER — OXYTOCIN/0.9 % SODIUM CHLORIDE 30/500 ML
999 PLASTIC BAG, INJECTION (ML) INTRAVENOUS ONCE
Status: DISCONTINUED | OUTPATIENT
Start: 2025-01-03 | End: 2025-01-05 | Stop reason: HOSPADM

## 2025-01-03 RX ORDER — SODIUM CHLORIDE 0.9 % (FLUSH) 0.9 %
1-10 SYRINGE (ML) INJECTION AS NEEDED
Status: DISCONTINUED | OUTPATIENT
Start: 2025-01-03 | End: 2025-01-05 | Stop reason: HOSPADM

## 2025-01-03 RX ORDER — CITRIC ACID/SODIUM CITRATE 334-500MG
30 SOLUTION, ORAL ORAL ONCE
Status: DISCONTINUED | OUTPATIENT
Start: 2025-01-03 | End: 2025-01-03 | Stop reason: HOSPADM

## 2025-01-03 RX ORDER — METHYLERGONOVINE MALEATE 0.2 MG/ML
200 INJECTION INTRAVENOUS ONCE AS NEEDED
Status: DISCONTINUED | OUTPATIENT
Start: 2025-01-03 | End: 2025-01-03 | Stop reason: HOSPADM

## 2025-01-03 RX ORDER — MISOPROSTOL 200 UG/1
800 TABLET ORAL AS NEEDED
Status: DISCONTINUED | OUTPATIENT
Start: 2025-01-03 | End: 2025-01-03 | Stop reason: HOSPADM

## 2025-01-03 RX ORDER — FAMOTIDINE 10 MG/ML
20 INJECTION, SOLUTION INTRAVENOUS ONCE AS NEEDED
Status: DISCONTINUED | OUTPATIENT
Start: 2025-01-03 | End: 2025-01-03 | Stop reason: HOSPADM

## 2025-01-03 RX ORDER — HYDROCODONE BITARTRATE AND ACETAMINOPHEN 10; 325 MG/1; MG/1
1 TABLET ORAL EVERY 4 HOURS PRN
Status: DISCONTINUED | OUTPATIENT
Start: 2025-01-03 | End: 2025-01-05 | Stop reason: HOSPADM

## 2025-01-03 RX ADMIN — ROPIVACAINE HYDROCHLORIDE 14 ML/HR: 2 INJECTION, SOLUTION EPIDURAL; INFILTRATION at 09:00

## 2025-01-03 RX ADMIN — IBUPROFEN 600 MG: 600 TABLET, FILM COATED ORAL at 17:00

## 2025-01-03 RX ADMIN — SODIUM CHLORIDE, POTASSIUM CHLORIDE, SODIUM LACTATE AND CALCIUM CHLORIDE 1000 ML: 600; 310; 30; 20 INJECTION, SOLUTION INTRAVENOUS at 08:35

## 2025-01-03 RX ADMIN — HYDROCODONE BITARTRATE AND ACETAMINOPHEN 1 TABLET: 10; 325 TABLET ORAL at 17:58

## 2025-01-03 RX ADMIN — LIDOCAINE HYDROCHLORIDE AND EPINEPHRINE 2 ML: 15; 5 INJECTION, SOLUTION EPIDURAL at 08:55

## 2025-01-03 RX ADMIN — PENICILLIN G 3 MILLION UNITS: 3000000 INJECTION, SOLUTION INTRAVENOUS at 04:54

## 2025-01-03 RX ADMIN — ONDANSETRON 4 MG: 2 INJECTION INTRAMUSCULAR; INTRAVENOUS at 10:28

## 2025-01-03 RX ADMIN — DOCUSATE SODIUM 100 MG: 100 CAPSULE, LIQUID FILLED ORAL at 20:09

## 2025-01-03 RX ADMIN — SIMETHICONE 80 MG: 80 TABLET, CHEWABLE ORAL at 08:13

## 2025-01-03 RX ADMIN — ROPIVACAINE HYDROCHLORIDE 5 ML: 5 INJECTION, SOLUTION EPIDURAL; INFILTRATION; PERINEURAL at 08:58

## 2025-01-03 RX ADMIN — WITCH HAZEL: 500 SOLUTION RECTAL; TOPICAL at 17:00

## 2025-01-03 RX ADMIN — LIDOCAINE HYDROCHLORIDE AND EPINEPHRINE 3 ML: 15; 5 INJECTION, SOLUTION EPIDURAL at 08:52

## 2025-01-03 RX ADMIN — FENTANYL CITRATE 100 MCG: 50 INJECTION, SOLUTION INTRAMUSCULAR; INTRAVENOUS at 08:58

## 2025-01-03 RX ADMIN — BUTORPHANOL TARTRATE 2 MG: 2 INJECTION, SOLUTION INTRAMUSCULAR; INTRAVENOUS at 04:01

## 2025-01-03 RX ADMIN — PENICILLIN G 3 MILLION UNITS: 3000000 INJECTION, SOLUTION INTRAVENOUS at 08:13

## 2025-01-03 RX ADMIN — PENICILLIN G 3 MILLION UNITS: 3000000 INJECTION, SOLUTION INTRAVENOUS at 00:54

## 2025-01-03 RX ADMIN — Medication: at 17:00

## 2025-01-03 RX ADMIN — HYDROCODONE BITARTRATE AND ACETAMINOPHEN 1 TABLET: 10; 325 TABLET ORAL at 22:37

## 2025-01-03 RX ADMIN — SODIUM CHLORIDE, POTASSIUM CHLORIDE, SODIUM LACTATE AND CALCIUM CHLORIDE 75 ML/HR: 600; 310; 30; 20 INJECTION, SOLUTION INTRAVENOUS at 10:28

## 2025-01-03 NOTE — PROGRESS NOTES
This  called the [employer] at 1410 to provide information for the birth announcement to be given to the patient's spouse who is currently serving in the [employer]. In [email or web address].  Information will then be sent to the spouse's commanding officer. The case # from the [employer] is:  [id number]

## 2025-01-03 NOTE — ANESTHESIA PREPROCEDURE EVALUATION
Anesthesia Evaluation     Patient summary reviewed and Nursing notes reviewed                Airway   Mallampati: II  TM distance: >3 FB  Neck ROM: full  Dental - normal exam     Pulmonary    (+) a smoker Former,  Cardiovascular - negative cardio ROS        Neuro/Psych- negative ROS  GI/Hepatic/Renal/Endo    (+) obesity    Musculoskeletal (-) negative ROS    Abdominal    Substance History - negative use     OB/GYN    (+) Pregnant        Other - negative ROS                   Anesthesia Plan    ASA 2     epidural       Anesthetic plan, risks, benefits, and alternatives have been provided, discussed and informed consent has been obtained with: patient.    CODE STATUS:    Level Of Support Discussed With: Patient  Code Status (Patient has no pulse and is not breathing): CPR (Attempt to Resuscitate)  Medical Interventions (Patient has pulse or is breathing): Full Support

## 2025-01-03 NOTE — PLAN OF CARE
Problem: Adult Inpatient Plan of Care  Goal: Plan of Care Review  Outcome: Progressing  Goal: Patient-Specific Goal (Individualized)  Outcome: Progressing  Flowsheets (Taken 1/3/2025 0903)  Patient/Family-Specific Goals (Include Timeframe): The patient will experience adequate pain control throughout the induction of labor process and immediate postpartum recovery period based on a patient reported numerical 0-10 pain scale.  Goal: Absence of Hospital-Acquired Illness or Injury  Outcome: Progressing  Intervention: Identify and Manage Fall Risk  Recent Flowsheet Documentation  Taken 1/3/2025 0821 by Daiana Rodrigez RN  Safety Promotion/Fall Prevention: safety round/check completed  Taken 1/3/2025 0703 by Daiana Rodrigez RN  Safety Promotion/Fall Prevention: safety round/check completed  Intervention: Prevent Skin Injury  Recent Flowsheet Documentation  Taken 1/3/2025 0821 by Daiana Rodrigez RN  Body Position: position changed independently  Taken 1/3/2025 0703 by Daiana Rodrigez RN  Body Position: position changed independently  Goal: Optimal Comfort and Wellbeing  Outcome: Progressing  Intervention: Monitor Pain and Promote Comfort  Recent Flowsheet Documentation  Taken 1/3/2025 0814 by Daiana Rodrigez RN  Pain Management Interventions: (pt declines need for pain medication at this time - Simethicone given for gas pain)   no interventions per patient request   other (see comments)  Taken 1/3/2025 0703 by Daiana Rodrigez RN  Pain Management Interventions:   pain management plan reviewed with patient/caregiver   medication offered but refused   no interventions per patient request  Intervention: Provide Person-Centered Care  Recent Flowsheet Documentation  Taken 1/3/2025 0814 by Daiana Rodrigez RN  Trust Relationship/Rapport:   care explained   choices provided   emotional support provided   questions encouraged   questions answered   reassurance provided   thoughts/feelings acknowledged  Taken 1/3/2025 0703 by  Daiana Rodrigez RN  Trust Relationship/Rapport:   care explained   choices provided   emotional support provided   reassurance provided   thoughts/feelings acknowledged  Goal: Readiness for Transition of Care  Outcome: Progressing     Problem: Labor  Goal: Hemostasis  Outcome: Progressing  Goal: Stable Fetal Wellbeing  Outcome: Progressing  Intervention: Promote and Monitor Fetal Wellbeing  Recent Flowsheet Documentation  Taken 1/3/2025 0821 by Daiana Rodrigez RN  Body Position: position changed independently  Taken 1/3/2025 0703 by Daiana Rodrigez RN  Body Position: position changed independently  Goal: Effective Progression to Delivery  Outcome: Progressing  Goal: Absence of Infection Signs and Symptoms  Outcome: Progressing  Goal: Acceptable Pain Control  Outcome: Progressing  Goal: Normal Uterine Contraction Pattern  Outcome: Progressing   Goal Outcome Evaluation:

## 2025-01-03 NOTE — L&D DELIVERY NOTE
[facility]   Vaginal Delivery Note    Patient Name: Juliet Taylor  : 2005  MRN: [id number]    Date of Delivery: 1/3/2025    Diagnosis     Pre & Post-Delivery:  Intrauterine pregnancy at 39w5d  Labor status: Induced Onset of Labor     (normal spontaneous vaginal delivery)             Problem List    Transfer to Postpartum     Review the Delivery Report for details.     Delivery     Delivery: Vaginal, Spontaneous    YOB: 2027   Time of Birth:  Gestational Age 1:30 PM  39w5d     Anesthesia: Epidural    Delivering clinician: José Manuel Michaels   Forceps?   No   Vacuum? No    Shoulder dystocia present: No        Delivery narrative:  Patient at 39w5d pushed to deliver a viable female infant over a second degree laceration with epidural anesthesia. Infant's head, anterior shoulder, and body delivered atraumatically. Vigorous infant was placed on mom's abdomen where the cord was clamped x2 and cut by patient's mom after a 2-minute delay. Placenta delivered spontaneously and appeared to be intact. Perineum was inspected and bilateral labial lacerations were found. Lacerations repaired with 2-0 Vicryl and 4-0 Vicryl rapide respectively. EBL 200ml. Mother and infant stable, bonding.         Infant     Findings: female infant     Infant observations: Weight: 3280 g (7 lb 3.7 oz)  Length: 19 in  Observations/Comments:        Apgars: 8  @ 1 minute /    9  @ 5 minutes   Infant Name: Kynlee     Placenta & Cord         Placenta delivered  Spontaneous at   1/3/2025  1:35 PM    Cord: 3 vessels present.   Nuchal Cord?  no   Cord blood obtained: Yes   Cord gases obtained:  No             Repair     Episiotomy: None        Lacerations: Yes  Laceration Information  Laceration Repaired?   Perineal: 2nd Yes   Periurethral:       Labial: bilateral Yes   Sulcus:       Vaginal:       Cervical:         Suture used for repair: 2-0 Vicryl, 4-0 Vicryl rapide  Laceration Length for 3rd or 4th degree lacerations: NA    Estimated Blood Loss:       Quantitative Blood Loss:    QBL from VAG DEL: 200 (01/03/25 1350)     Complications     none    Disposition     Mother to Mother Baby/Postpartum  in stable condition currently.  Baby to remains with mom  in stable condition currently.    José Manuel Michaels CNM  01/03/25  13:56 EST

## 2025-01-03 NOTE — PAYOR COMM NOTE
"Juliet Rodrigues (19 y.o. Female)     From: Aneta Oseguera LPN, Utilization Review  Phone #[phone]  Fax #[phone]    [facility] ID#[id number]     ADDRESS:   [address] [place] [address]  PHONE #[phone]    FACILITY:  [facility]  NPI#[id number]  TAX ID#[id number]  [address] [id number]  PHONE #[phone]    PHYSICIAN:  DR ERNIE TREADWELL  NPI#[id number]  [address] [facility] [address]  [place]  [address]  PHONE #[phone]    DIAGNOSIS CODE  O80  VAGINAL DELIVERY          Date of Birth   2005    Social Security Number       Address   [address] [place] [address]    Home Phone   [phone]    MRN   [id number]       Hindu   None    Marital Status                               Admission Date   1/2/25    Admission Type   Elective    Admitting Provider   Ernie Treadwell DO    Attending Provider   José Manuel Michaels CNM    Department, Room/Bed   [facility] MOTHER BABY 4A, N421/1       Discharge Date       Discharge Disposition       Discharge Destination                                 Attending Provider: José Manuel Michaels CNM    Allergies: No Known Allergies    Isolation: None   Infection: None   Code Status: CPR    Ht: 160 cm (63\")   Wt: --    Admission Cmt: None   Principal Problem: 39 weeks gestation of pregnancy [Z3A.39]                   Active Insurance as of 1/2/2025       Primary Coverage       Payor Plan Insurance Group Employer/Plan Group    [facility] CLAIMS [facility]        Payor Plan Address Payor Plan Phone Number Payor Plan Fax Number Effective Dates    [address] 7/8/2023 - None Entered    ATTN: NEW CLAIMS       [facility] [id number]         Subscriber Name Subscriber Birth Date Member ID       JULIET RODRIGUES 2005 [id number]               Secondary Coverage       Payor Plan Insurance Group Employer/Plan Group    [facility]        Payor Plan " Address Payor Plan Phone Number Payor Plan Fax Number Effective Dates    [address] [phone]  2023 - 2025    ATTN: NEW CLAIMS       [place] [address]         Subscriber Name Subscriber Birth Date Member ID       JULIET RODRIGUES 2005 [id number]                     Emergency Contacts        (Rel.) Home Phone Work Phone Mobile Phone    Corby Rodrigues (Spouse) [phone] -- [phone]              Insurance Information                   [employer] [place] Phone: --    Subscriber: Juliet Rodrigues Subscriber#: [id number]    Group#: -- Precert#: --    Authorization#: -- Effective Date: --        / [place] Phone: [phone]    Subscriber: Juliet Rodrigues Subscriber#: [id number]    Group#: -- Precert#: --    Authorization#: -- Effective Date: --             History & Physical        José Manuel Michaels CNM at 25 0828       Attestation signed by Sol Clarke MD at 25 0907    I have reviewed this documentation and agree.                  [facility]  Obstetric History and Physical    Chief Complaint   Patient presents with    Scheduled Induction       Subjective    Patient is a 19 y.o. female  currently at 39w5d, who presents for induction of labor  for elective  with favorable cervix. She voices good fetal movement. She denies vaginal bleeding and leaking of fluid. She plans epidural for labor and birth.     Her prenatal care is complicated by  GBS +.  Her previous obstetric/gynecological history is non-contributory.    The following portions of the patients history were reviewed and updated as appropriate: current medications, allergies, past medical history, past surgical history, past family history, past social history, and problem list .       Prenatal Information:   Maternal Prenatal Labs  Blood Type ABO Type   Date Value Ref Range Status   2025 A  Final      Rh Status RH type   Date Value Ref  "Range Status   2025 Positive  Final      Antibody Screen Antibody Screen   Date Value Ref Range Status   2025 Negative  Final      Gonnorhea No results found for: \"GCCX\"   Chlamydia No results found for: \"CLAMYDCU\"   RPR No results found for: \"RPR\"   Syphilis Antibody No results found for: \"SYPHILIS\"   Rubella No results found for: \"RUBELLAIGGIN\"   Hepatitis B Surface Antigen No results found for: \"HEPBSAG\"   HIV-1 Antibody No results found for: \"LABHIV1\"   Hepatitis C Antibody No results found for: \"HEPCAB\"   Rapid Urin Drug Screen No results found for: \"AMPMETHU\", \"BARBITSCNUR\", \"LABBENZSCN\", \"LABMETHSCN\", \"LABOPIASCN\", \"THCURSCR\", \"COCAINEUR\", \"AMPHETSCREEN\", \"PROPOXSCN\", \"BUPRENORSCNU\", \"METAMPSCNUR\", \"OXYCODONESCN\", \"TRICYCLICSCN\"   Group B Strep Culture Positive                 Past OB History:       OB History    Para Term  AB Living   2 0 0 0 1 0   SAB IAB Ectopic Molar Multiple Live Births   1 0 0 0 0 0      # Outcome Date GA Lbr Jewel/2nd Weight Sex Type Anes PTL Lv   2 Current            1 2024 9w0d              Past Medical History: Past Medical History:   Diagnosis Date    Anxiety       Past Surgical History History reviewed. No pertinent surgical history.   Family History: History reviewed. No pertinent family history.   Social History:  reports that she quit smoking about a year ago. Her smoking use included cigarettes. She has never used smokeless tobacco.   reports that she does not currently use alcohol.   reports no history of drug use.        REVIEW OF SYSTEMS             Reports fetal movement is normal             Denies leakage of amniotic fluid.             Denies vaginal bleeding             She reports Regular contractions, frequency: Every 4 minutes, intensity moderate             All other systems reviewed and are negative    Objective      Vital Signs Range for the last 24 hours  Temperature: Temp:  [97.6 °F (36.4 °C)-98.8 °F (37.1 °C)] 98.3 °F (36.8 °C) "   Temp Source: Temp src: Oral   BP: BP: (123-141)/(76-88) 133/84   Pulse: Heart Rate:  [67-92] 88   Respirations: Resp:  [14-19] 18   SPO2: SpO2:  [97 %-98 %] 98 %   O2 Amount (l/min):     O2 Devices Device (Oxygen Therapy): room air   Weight:         Presentation: vertex   Cervix: Exam by:  HITESH Michaels CNM   Dilation:  4   Effacement: Cervical Effacement: 80   Station:  -1       Fetal Heart Rate Assessment   Method: Fetal HR Assessment Method: external   Beats/min: Fetal HR (beats/min): 115   Baseline: Fetal HR Baseline: normal range   Varibility: Fetal HR Variability: moderate (amplitude range 6 to 25 bpm)   Accels: Fetal HR Accelerations: greater than/equal to 15 bpm   Decels: Fetal HR Decelerations: absent   Tracing Category:  1    NST: REACTIVE     Uterine Assessment   Method: Method: external tocotransducer   Frequency (min): Contraction Frequency (Minutes): 2-3   Ctx Count in 10 min:     Duration:     Intensity: Contraction Intensity: moderate   Intensity by IUPC:     Resting Tone: Uterine Resting Tone: soft by palpation   Resting Tone by IUPC:     Mize Units:             Constitutional:  Well developed, well nourished, no acute distress, well-groomed.   Respiratory:  Resp e/e/u, normal breath sounds.   Cardiovascular:  Normal rate and rhythm, no murmurs.   Gastrointestinal:  Soft, gravid, nontender.  Uterus: Soft, nontender. Fundus appropriate for dates.  Neurologic:  Alert & oriented x 3,  no focal deficits noted.   Psychiatric:  Speech and behavior appropriate.   Extremities: no cyanosis, clubbing or edema, no evidence of DVT.        Labs:  Lab Results   Component Value Date    WBC 11.21 (H) 01/02/2025    HGB 11.2 (L) 01/02/2025    HCT 32.5 (L) 01/02/2025    MCV 81.3 01/02/2025     01/02/2025    CREATININE 0.37 (L) 10/31/2024    URICACID 3.1 10/31/2024    AST 12 10/31/2024    ALT 7 10/31/2024     10/29/2024     Results from last 7 days   Lab Units 01/02/25 2233 01/02/25 2046   ABO TYPING   A A   RH TYPING  Positive Positive   ANTIBODY SCREEN   --  Negative           Assessment & Plan      39 weeks gestation of pregnancy        Assessment:  1.  Intrauterine pregnancy at 39w5d weeks gestation with reactive fetal status.    2.   induction of labor  for elective  with favorable cervix  3.  Obstetrical history is non-contributory.  4.  GBS status: Positive    Plan:  1.Oxytocin induction and Antibiotics for GBS prophylaxis  2. AROM, clear fluid  3. Plan of care has been reviewed with patient and questions answered.  4.  Risks, benefits of treatment plan have been discussed.  5.  All questions have been answered.        José Manuel Michaels CNM  1/3/2025  08:28 EST    Electronically signed by Sol Clarke MD at 01/03/25 0907       Facility-Administered Medications as of 1/3/2025   Medication Dose Route Frequency Provider Last Rate Last Admin    acetaminophen (TYLENOL) tablet 650 mg  650 mg Oral Q6H PRN José Manuel Michaels CNM        benzocaine (AMERICAINE) 20 % rectal ointment 1 Application  1 Application Rectal PRN José Manuel Michaels CNM        benzocaine-menthol (DERMOPLAST) 20-0.5 % topical spray   Topical PRN José Manuel Michaels CNM        [START ON 1/4/2025] bisacodyl (DULCOLAX) suppository 10 mg  10 mg Rectal Daily PRN José Manuel Michaels CNM        docusate sodium (COLACE) capsule 100 mg  100 mg Oral BID José Manuel Michaels CNM        ePHEDrine Sulfate (Pressors) 5 MG/ML injection  - ADS Override Pull             [COMPLETED] fentaNYL citrate (PF) (SUBLIMAZE) 50 mcg/mL injection  - ADS Override Pull             HYDROcodone-acetaminophen (NORCO) 5-325 MG per tablet 1 tablet  1 tablet Oral Q4H PRN José Manuel Michaels CNM        Or    HYDROcodone-acetaminophen (NORCO)  MG per tablet 1 tablet  1 tablet Oral Q4H PRN José Manuel Michaels CNM        Hydrocortisone (Perianal) (ANUSOL-HC) 2.5 % rectal cream 1 Application  1 Application Rectal PRN BrandoJosé Manuel ibarra CNM        ibuprofen (ADVIL,MOTRIN) tablet 600 mg  600 mg Oral  Q6H PRN José Manuel Michaels CNM        [COMPLETED] lactated ringers bolus 1,000 mL  1,000 mL Intravenous PRN Cam Estefanía, Rosa A, DO 4,000 mL/hr at 01/03/25 0835 1,000 mL at 01/03/25 0835    lactated ringers infusion  75 mL/hr Intravenous Continuous BrandoJosé Manuel cherry CNM   Stopped at 01/03/25 1335    lanolin topical 1 Application  1 Application Topical Q1H PRN BrandoJosé Manuel ibarra CNM        magnesium hydroxide (MILK OF MAGNESIA) suspension 10 mL  10 mL Oral Daily PRN BrandoJosé Manuel ibarra CNM        ondansetron (ZOFRAN) injection 4 mg  4 mg Intravenous Q6H PRN José Manuel Michaels CNM        oxytocin (PITOCIN) 30 units in 0.9% sodium chloride 500 mL (premix)  999 mL/hr Intravenous Once José Manuel Michaels CNM        Followed by    oxytocin (PITOCIN) 30 units in 0.9% sodium chloride 500 mL (premix)  250 mL/hr Intravenous Continuous BrandoJosé Manuel ibarra CNM        oxytocin (PITOCIN) 30 units in 0.9% sodium chloride 500 mL (premix)  125 mL/hr Intravenous Once PRN José Manuel Michaels CNM        [COMPLETED] penicillin G potassium 5 Million Units in sodium chloride 0.9 % 100 mL MBP  5 Million Units Intravenous Once José Manuel Michaels CNM   5 Million Units at 01/02/25 2101    prenatal vitamin tablet 1 tablet  1 tablet Oral Daily José Manuel Michaels CNM        ropivacaine (NAROPIN) 0.2 % injection  15 mL/hr Epidural Continuous Cam Rosa José A, DO   Stopped at 01/03/25 1335    simethicone (MYLICON) chewable tablet 80 mg  80 mg Oral 4x Daily PRN José Manuel Michaels CNM   80 mg at 01/03/25 0813    sodium chloride 0.9 % flush 1-10 mL  1-10 mL Intravenous PRN José Manuel Michaels CNM        witch hazel-glycerin (TUCKS) pad   Topical PRN José Manuel Michaels CNM         Orders (last 24 hrs)        Start     Ordered    01/04/25 0800  Sitz Bath  3 Times Daily        Comments: PRN    01/03/25 1505    01/04/25 0600  CBC & Differential  Timed        Comments: Postpartum Day 1      01/03/25 1505    01/04/25 0000  bisacodyl (DULCOLAX) suppository 10 mg   Daily PRN         01/03/25 1505    01/03/25 2100  docusate sodium (COLACE) capsule 100 mg  2 Times Daily         01/03/25 1505    01/03/25 1600  prenatal vitamin tablet 1 tablet  Daily         01/03/25 1505    01/03/25 1506  Transfer Patient  Once         01/03/25 1505    01/03/25 1506  Code Status and Medical Interventions: CPR (Attempt to Resuscitate); Full  Continuous         01/03/25 1505    01/03/25 1506  Vital Signs Per hospital policy  Per Hospital Policy         01/03/25 1505    01/03/25 1506  Notify Physician  Until Discontinued         01/03/25 1505    01/03/25 1506  Up Ad Carmen  Until Discontinued         01/03/25 1505    01/03/25 1506  Ambulate Patient  Every Shift       01/03/25 1505    01/03/25 1506  Diet: Regular/House; Fluid Consistency: Thin (IDDSI 0)  Diet Effective Now         01/03/25 1505    01/03/25 1506  Advance Diet As Tolerated -Regular  Until Discontinued         01/03/25 1505    01/03/25 1506  Fundal and Lochia Check  Per Hospital Policy        Comments: Q 15 min x 4, Q 30 min x 2, then Q Shift    01/03/25 1505    01/03/25 1506  RN to Assess Rh Status & Place RhIG Evaluation Order if Indicated  Continuous         01/03/25 1505    01/03/25 1506  Bladder Assessment  Per Order Details        Comments: Postpartum 1) Upon Admission to Unit & Every 4 Hours PRN Until Voiding. 2) Out of Bed to Void in 8 Hours.    01/03/25 1505    01/03/25 1506  Straight Cath  Per Order Details        Comments: Postpartum: If Distended & Unable to Void, May Repeat Once.    01/03/25 1505    01/03/25 1506  Indwelling Urinary Catheter  Per Order Details        Comments: Postpartum : After Straight Cathed x2 or if Greater Than 1000mL Residual, Insert Indwelling Urinary Catheter Until Further MD Order.    01/03/25 1505    01/03/25 1506  Breast pump to bed  Once         01/03/25 1505    01/03/25 1506  If indicated -- Please administer RH Immunoglobulin based on results of cord blood evaluation and fetal screen lab tests,  "pharmacy to dispense  Per Order Details        Comments: See Process Instructions For Reference Range Details.    01/03/25 1505    01/03/25 1506  VTE Prophylaxis Not Indicated: Low Risk; No Risk Factors (0)  Once         01/03/25 1505    01/03/25 1505  sodium chloride 0.9 % flush 1-10 mL  As Needed         01/03/25 1505    01/03/25 1505  oxytocin (PITOCIN) 30 units in 0.9% sodium chloride 500 mL (premix)  Once As Needed         01/03/25 1505    01/03/25 1505  ibuprofen (ADVIL,MOTRIN) tablet 600 mg  Every 6 Hours PRN         01/03/25 1505    01/03/25 1505  acetaminophen (TYLENOL) tablet 650 mg  Every 6 Hours PRN         01/03/25 1505    01/03/25 1505  HYDROcodone-acetaminophen (NORCO) 5-325 MG per tablet 1 tablet  Every 4 Hours PRN        Placed in \"Or\" Linked Group    01/03/25 1505    01/03/25 1505  HYDROcodone-acetaminophen (NORCO)  MG per tablet 1 tablet  Every 4 Hours PRN        Placed in \"Or\" Linked Group    01/03/25 1505    01/03/25 1505  magnesium hydroxide (MILK OF MAGNESIA) suspension 10 mL  Daily PRN         01/03/25 1505    01/03/25 1505  lanolin topical 1 Application  Every 1 Hour PRN         01/03/25 1505    01/03/25 1505  benzocaine-menthol (DERMOPLAST) 20-0.5 % topical spray  As Needed         01/03/25 1505    01/03/25 1505  witch hazel-glycerin (TUCKS) pad  As Needed         01/03/25 1505    01/03/25 1505  Hydrocortisone (Perianal) (ANUSOL-HC) 2.5 % rectal cream 1 Application  As Needed         01/03/25 1505    01/03/25 1505  benzocaine (AMERICAINE) 20 % rectal ointment 1 Application  As Needed         01/03/25 1505    01/03/25 1505  ondansetron (ZOFRAN) injection 4 mg  Every 6 Hours PRN         01/03/25 1505    01/03/25 1445  oxytocin (PITOCIN) 30 units in 0.9% sodium chloride 500 mL (premix)  Continuous        Placed in \"Followed by\" Linked Group    01/03/25 1333    01/03/25 1430  oxytocin (PITOCIN) 30 units in 0.9% sodium chloride 500 mL (premix)  Once        Placed in \"Followed by\" Linked " Group    01/03/25 1333    01/03/25 1334  Nurse may remove epidural catheter after delivery.  Until Discontinued         01/03/25 1333    01/03/25 1334  Transfer to postpartum when discharge criteria met.  Until Discontinued         01/03/25 1333    01/03/25 1334  Notify Physician (specified)  Until Discontinued         01/03/25 1333    01/03/25 1334  Vital Signs Per Hospital Policy  Per Hospital Policy         01/03/25 1333    01/03/25 1334  Fundal & Lochia Check  Per Order Details        Comments: Every 15 Minutes x4, Then Every 30 Minutes x2, Then Every Shift    01/03/25 1333    01/03/25 1334  Indwelling Urinary Catheter  Once,   Status:  Canceled         01/03/25 1333    01/03/25 1334  Diet: Regular/House; Fluid Consistency: Thin (IDDSI 0)  Diet Effective Now,   Status:  Canceled         01/03/25 1333    01/03/25 1333  Fundal & Lochia Check  Every Shift       01/03/25 1333    01/03/25 1333  ibuprofen (ADVIL,MOTRIN) tablet 600 mg  Every 6 Hours PRN,   Status:  Discontinued         01/03/25 1333    01/03/25 1333  methylergonovine (METHERGINE) injection 200 mcg  Once As Needed,   Status:  Discontinued         01/03/25 1333    01/03/25 1333  carboprost (HEMABATE) injection 250 mcg  As Needed,   Status:  Discontinued         01/03/25 1333    01/03/25 1333  miSOPROStol (CYTOTEC) tablet 800 mcg  As Needed,   Status:  Discontinued         01/03/25 1333    01/03/25 0930  ropivacaine (NAROPIN) 0.2 % injection  Continuous         01/03/25 0841    01/03/25 0930  Sod Citrate-Citric Acid (BICITRA) oral solution 30 mL  Once,   Status:  Discontinued         01/03/25 0841    01/03/25 0844  fentaNYL citrate (PF) (SUBLIMAZE) 50 mcg/mL injection  - ADS Override Pull        Note to Pharmacy: Created by cabinet override    01/03/25 0844    01/03/25 0842  Vital Signs Per Anesthesia Guidelines  Per Order Details        Comments: Every 3 Minutes x20 Minutes Following Epidural Dosing, Then Every 15 Minutes If Stable    01/03/25 0841     "01/03/25 0842  Start IV with #16 or #18 gauge angiocath.  Once         01/03/25 0841    01/03/25 0842  Nurse or anesthesiologist to remain with patient for 15 minutes following dosing.  Until Discontinued         01/03/25 0841    01/03/25 0842  Facilitate maternal postion on side and maintain uterine displacement.  Until Discontinued         01/03/25 0841    01/03/25 0842  Consult anesthesia services prior to changing epidural infusion/rate.  Until Discontinued         01/03/25 0841    01/03/25 0841  lactated ringers bolus 1,000 mL  As Needed         01/03/25 0841    01/03/25 0841  ePHEDrine Sulfate (Pressors) 5 MG/ML injection 10 mg  Every 10 Minutes PRN,   Status:  Discontinued         01/03/25 0841    01/03/25 0841  metoclopramide (REGLAN) injection 10 mg  Once As Needed,   Status:  Discontinued         01/03/25 0841    01/03/25 0841  ondansetron (ZOFRAN) injection 4 mg  Once As Needed,   Status:  Discontinued         01/03/25 0841    01/03/25 0841  famotidine (PEPCID) injection 20 mg  Once As Needed,   Status:  Discontinued         01/03/25 0841    01/03/25 0841  diphenhydrAMINE (BENADRYL) injection 12.5 mg  Every 8 Hours PRN,   Status:  Discontinued         01/03/25 0841    01/03/25 0828  Admit To Obstetrics Inpatient  Once         01/03/25 0828    01/03/25 0807  simethicone (MYLICON) chewable tablet 80 mg  4 Times Daily PRN         01/03/25 0808    01/03/25 0500  oxytocin (PITOCIN) 30 units in 0.9% sodium chloride 500 mL (premix)  Titrated,   Status:  Discontinued         01/02/25 1944 01/03/25 0030  penicillin G in iso-osmotic dextrose IVPB 3 million units (premix)  Every 4 Hours,   Status:  Discontinued        Placed in \"Followed by\" Linked Group    01/02/25 1944 01/02/25 2230  oxytocin (PITOCIN) 30 units in 0.9% sodium chloride 500 mL (premix)  Titrated,   Status:  Discontinued         01/02/25 2218 01/02/25 2102  ABO RH Specimen Verification  STAT         01/02/25 2101 01/02/25 2100  sodium " "chloride 0.9 % flush 10 mL  Every 12 Hours Scheduled,   Status:  Discontinued         01/02/25 1944 01/02/25 2030  lactated ringers infusion  Continuous         01/02/25 1944 01/02/25 2030  mineral oil liquid 30 mL  Once,   Status:  Discontinued         01/02/25 1944 01/02/25 2030  penicillin G potassium 5 Million Units in sodium chloride 0.9 % 100 mL MBP  Once        Placed in \"Followed by\" Linked Group    01/02/25 1944 01/02/25 1952  ePHEDrine Sulfate (Pressors) 5 MG/ML injection  - ADS Override Pull        Note to Pharmacy: Created by cabinet override    01/02/25 1952 01/02/25 1945  Code Status and Medical Interventions: CPR (Attempt to Resuscitate); Full Support  Continuous,   Status:  Canceled         01/02/25 1944 01/02/25 1945  Obtain informed consent  Once         01/02/25 1944 01/02/25 1945  Vital Signs Per Hospital Policy  Per Hospital Policy         01/02/25 1944 01/02/25 1945  Mini- prep prior to delivery  Once         01/02/25 1944 01/02/25 1945  Continuous Fetal Monitoring With NST on Admission and Prior to Initiation of Oxytocin.  Per Order Details        Comments: Continuous Fetal Monitoring With NST on Admission & Prior to Initiation of Oxytocin.    01/02/25 1944 01/02/25 1945  External Uterine Contraction Monitoring  Per Hospital Policy         01/02/25 1944 01/02/25 1945  Notify Physician (specified)  Until Discontinued         01/02/25 1944 01/02/25 1945  Notify physician for tachysystole (per hospital algorithm)  Until Discontinued         01/02/25 1944 01/02/25 1945  Notify physician if membranes ruptured, bleeding greater than 1 pad an hour, fetal heart tone abnormality, and severe pain  Until Discontinued         01/02/25 1944 01/02/25 1945  Bed Rest with Bathroom Privileges  Once         01/02/25 1944 01/02/25 1945  Initiate Group Beta Strep (GBS) Prophylaxis Protocol, If Criteria Met  Continuous        Comments: NO TREATMENT RECOMMENDED IF: 1)  " Maternal GBS status known negative 2)  Scheduled  birth with intact membranes, not in labor.  3 ) Maternal GBS unknown, no risk factors.   TREAT WITH ANTIBIOTICS IF:  1)  Maternal GBS status is known postive.  2)  Maternal GBS status unknown with these risk factors:  a)  Previous infant affected by GBS infection.  b)  GBS urinary tract infection (UTI) or bacteruria during pregnancy  c)  Unexplained maternal fever in labor (greater than or equal to 100.4F or 38.0C)  d)  Prolonged rupture of the membranes greater than or equal to 18 hours.  e)  Gestational age less than 37 weeks.    25  Durand Bulb Cervical Ripening Without Infusion  Once        Comments: Have Laborist Place Cervical Durand Without Infusion.    If Unable to Place Durand, Start Low Dose Pitocin Drip Overnight, Then Increase Per Protocol at 0500 Next Morning    25  CBC (No Diff)  Once         25  Treponema pallidum AB w/Reflex RPR  Once         25  Type & Screen  Once         25  Insert Peripheral IV  Once         25  Saline Lock & Maintain IV Access  Continuous         25  VTE Prophylaxis Not Indicated: Low Risk; No Risk Factors (0)  Once         25  Diet: Liquid; Clear Liquid; Fluid Consistency: Thin (IDDSI 0)  Diet Effective Now,   Status:  Canceled         25  sodium chloride 0.9 % flush 10 mL  As Needed,   Status:  Discontinued         25  sodium chloride 0.9 % infusion 40 mL  As Needed,   Status:  Discontinued         25  lidocaine PF 1% (XYLOCAINE) injection 0.5 mL  Once As Needed,   Status:  Discontinued         25  lactated ringers bolus 1,000 mL  Once As Needed,   Status:  Discontinued         25  "   01/02/25 1944  acetaminophen (TYLENOL) tablet 650 mg  Every 4 Hours PRN,   Status:  Discontinued         01/02/25 1944 01/02/25 1944  butorphanol (STADOL) injection 1 mg  Every 2 Hours PRN,   Status:  Discontinued         01/02/25 1944 01/02/25 1944  butorphanol (STADOL) injection 2 mg  Every 2 Hours PRN,   Status:  Discontinued         01/02/25 1944 01/02/25 1944  ondansetron ODT (ZOFRAN-ODT) disintegrating tablet 4 mg  Every 6 Hours PRN,   Status:  Discontinued        Placed in \"Or\" Linked Group    01/02/25 1944 01/02/25 1944  ondansetron (ZOFRAN) injection 4 mg  Every 6 Hours PRN,   Status:  Discontinued        Placed in \"Or\" Linked Group    01/02/25 1944 01/02/25 1944  terbutaline (BRETHINE) injection 0.25 mg  As Needed,   Status:  Discontinued         01/02/25 1944 01/02/25 1944  famotidine (PEPCID) injection 20 mg  Every 12 Hours PRN,   Status:  Discontinued        Placed in \"Or\" Linked Group    01/02/25 1944 01/02/25 1944  famotidine (PEPCID) tablet 20 mg  Every 12 Hours PRN,   Status:  Discontinued        Placed in \"Or\" Linked Group    01/02/25 1944 01/02/25 1944  diphenhydrAMINE (BENADRYL) capsule 25 mg  Nightly PRN,   Status:  Discontinued        Placed in \"Or\" Linked Group    01/02/25 1944 01/02/25 1944  diphenhydrAMINE (BENADRYL) injection 25 mg  Nightly PRN,   Status:  Discontinued        Placed in \"Or\" Linked Group    01/02/25 1944    Unscheduled  Position Change - For Intra-Uterine Resusitation for Hypertonus, HyperStimulation or Non-Reassuring Fetal Status  As Needed       01/02/25 1944    Unscheduled  Up with Assistance  As Needed       01/03/25 1333    Unscheduled  Apply Ice to Perineum  As Needed       01/03/25 1333    Unscheduled  Bladder Assessment  As Needed       01/03/25 1333    Unscheduled  Apply Ice to Perineum  As Needed      Comments: For 20 min q 2 hrs    01/03/25 1505    Unscheduled  Waffle Cushion  As Needed      Comments: For perineal discomfort    " 25 1505    Unscheduled  Donut Ring  As Needed      Comments: For perineal pain    25 1505    Unscheduled  Kpad  As Needed      Comments: For pain    25 1505    Unscheduled  Warm compress  As Needed       25 1505    Unscheduled  Apply ace wrap, tight bra, or binder  As Needed       25 1505    Unscheduled  Apply ice packs  As Needed       25 1505                     Operative/Procedure Notes (last 24 hours)        José Manuel Michaels CNM at 25 1356           [facility]   Vaginal Delivery Note    Patient Name: Juliet Taylor  : 2005  MRN: [id number]    Date of Delivery: 1/3/2025    Diagnosis     Pre & Post-Delivery:  Intrauterine pregnancy at 39w5d  Labor status: Induced Onset of Labor     (normal spontaneous vaginal delivery)             Problem List    Transfer to Postpartum     Review the Delivery Report for details.     Delivery     Delivery: Vaginal, Spontaneous    YOB: 2027   Time of Birth:  Gestational Age 1:30 PM  39w5d     Anesthesia: Epidural    Delivering clinician: José Manuel Michaels   Forceps?   No   Vacuum? No    Shoulder dystocia present: No        Delivery narrative:  Patient at 39w5d pushed to deliver a viable female infant over a second degree laceration with epidural anesthesia. Infant's head, anterior shoulder, and body delivered atraumatically. Vigorous infant was placed on mom's abdomen where the cord was clamped x2 and cut by patient's mom after a 2-minute delay. Placenta delivered spontaneously and appeared to be intact. Perineum was inspected and bilateral labial lacerations were found. Lacerations repaired with 2-0 Vicryl and 4-0 Vicryl rapide respectively. EBL 200ml. Mother and infant stable, bonding.         Infant     Findings: female infant     Infant observations: Weight: 3280 g (7 lb 3.7 oz)  Length: 19 in  Observations/Comments:        Apgars: 8  @ 1 minute /    9  @ 5 minutes   Infant Name: Price     Placenta &  Cord         Placenta delivered  Spontaneous at   1/3/2025  1:35 PM    Cord: 3 vessels present.   Nuchal Cord?  no   Cord blood obtained: Yes   Cord gases obtained:  No             Repair     Episiotomy: None        Lacerations: Yes  Laceration Information  Laceration Repaired?   Perineal: 2nd Yes   Periurethral:       Labial: bilateral Yes   Sulcus:       Vaginal:       Cervical:         Suture used for repair: 2-0 Vicryl, 4-0 Vicryl rapide  Laceration Length for 3rd or 4th degree lacerations: NA   Estimated Blood Loss:       Quantitative Blood Loss:    QBL from VAG DEL: 200 (01/03/25 1350)     Complications     none    Disposition     Mother to Mother Baby/Postpartum  in stable condition currently.  Baby to remains with mom  in stable condition currently.    José Manuel Michaels CNM  01/03/25  13:56 EST          Electronically signed by José Manuel Michaels CNM at 01/03/25 1359       Physician Progress Notes (last 24 hours)  Notes from 01/02/25 1507 through 01/03/25 1507   No notes of this type exist for this encounter.

## 2025-01-03 NOTE — SIGNIFICANT NOTE
Continuous stream of blood with fundal massage. Bleeding appears to be higher up than vaginal. JONATHAN Michaels CNM notified. Coming to bedside to assess

## 2025-01-03 NOTE — ANESTHESIA PROCEDURE NOTES
Labor Epidural      Patient reassessed immediately prior to procedure    Patient location during procedure: OB  Performed By  Anesthesiologist: Rosa Ferrara DO  Preanesthetic Checklist  Completed: patient identified, IV checked, risks and benefits discussed, surgical consent, monitors and equipment checked, pre-op evaluation and timeout performed  Additional Notes  CSE performed using 25g Drew  Prep:  Pt Position:sitting  Sterile Tech:cap, gloves, mask and sterile barrier  Prep:chlorhexidine gluconate and isopropyl alcohol  Monitoring:blood pressure monitoring  Epidural Block Procedure:  Approach:midline  Guidance:palpation technique  Location:L3-L4  Needle Type:Tuohy  Needle Gauge:17 G  Loss of Resistance Medium: air  Loss of Resistance: 5cm  Cath Depth at skin:12 cm  Paresthesia: none  Aspiration:negative  Test Dose:negative  Number of Attempts: 1  Post Assessment:  Dressing:occlusive dressing applied and secured with tape  Pt Tolerance:patient tolerated the procedure well with no apparent complications  Complications:no

## 2025-01-03 NOTE — PLAN OF CARE
Goal Outcome Evaluation:      Maintain optimal fetal and maternal well-being.

## 2025-01-03 NOTE — PROGRESS NOTES
TAMMY Agarwal  Juliet Taylor  : 2005  MRN: [id number]  CSN: [id number]    Labor progress note    Subjective   She is feeling rectal pressure.     Objective   Min/max vitals past 24 hours:  Temp  Min: 97.6 °F (36.4 °C)  Max: 98.8 °F (37.1 °C)   BP  Min: 115/68  Max: 141/88   Pulse  Min: 67  Max: 100   Resp  Min: 14  Max: 20        FHT's: reassuring and category 1   Cervix: was checked (by me): 10 cm / 100 % / +1   Contractions: regular every 3 minutes - external monitors used        Assessment   IUP at 39w5d  Progressing in labor  Fetal status reassuring  GBS positive     Plan   Begin to push    José Manuel Michaels CNM  1/3/2025  12:48 EST

## 2025-01-03 NOTE — PROGRESS NOTES
visited to obtain information regarding her spouse who is deployed in [place].  Called the [place].  Informed that a call is to be placed when the baby is born.   explained this to the patient and will follow up with call to [facility] after birth.

## 2025-01-03 NOTE — H&P
"[address] [place] [address]  Obstetric History and Physical    Chief Complaint   Patient presents with    Scheduled Induction       Subjective     Patient is a 19 y.o. female  currently at 39w5d, who presents for induction of labor  for elective  with favorable cervix. She voices good fetal movement. She denies vaginal bleeding and leaking of fluid. She plans epidural for labor and birth.     Her prenatal care is complicated by  GBS +.  Her previous obstetric/gynecological history is non-contributory.    The following portions of the patients history were reviewed and updated as appropriate: current medications, allergies, past medical history, past surgical history, past family history, past social history, and problem list .       Prenatal Information:   Maternal Prenatal Labs  Blood Type ABO Type   Date Value Ref Range Status   2025 A  Final      Rh Status RH type   Date Value Ref Range Status   2025 Positive  Final      Antibody Screen Antibody Screen   Date Value Ref Range Status   2025 Negative  Final      Gonnorhea No results found for: \"GCCX\"   Chlamydia No results found for: \"CLAMYDCU\"   RPR No results found for: \"RPR\"   Syphilis Antibody No results found for: \"SYPHILIS\"   Rubella No results found for: \"RUBELLAIGGIN\"   Hepatitis B Surface Antigen No results found for: \"HEPBSAG\"   HIV-1 Antibody No results found for: \"LABHIV1\"   Hepatitis C Antibody No results found for: \"HEPCAB\"   Rapid [place]rin Drug Screen No results found for: \"AMPMETHU\", \"BARBITSCNUR\", \"LABBENZSCN\", \"LABMETHSCN\", \"LABOPIASCN\", \"THCURSCR\", \"COCAINEUR\", \"AMPHETSCREEN\", \"PROPOXSCN\", \"BUPRENORSCNU\", \"METAMPSCNUR\", \"OXYCODONESCN\", \"TRICYCLICSCN\"   Group B Strep Culture Positive                 Past OB History:       OB History    Para Term  AB Living   2 0 0 0 1 0   SAB IAB Ectopic Molar Multiple Live Births   1 0 0 0 0 0      # Outcome Date GA Lbr Jewel/2nd Weight Sex Type Anes PTL Lv   2 Current            1 2024 " 9w0d              Past Medical History: Past Medical History:   Diagnosis Date    Anxiety       Past Surgical History History reviewed. No pertinent surgical history.   Family History: History reviewed. No pertinent family history.   Social History:  reports that she quit smoking about a year ago. Her smoking use included cigarettes. She has never used smokeless tobacco.   reports that she does not currently use alcohol.   reports no history of drug use.        REVIEW OF SYSTEMS             Reports fetal movement is normal             Denies leakage of amniotic fluid.             Denies vaginal bleeding             She reports Regular contractions, frequency: Every 4 minutes, intensity moderate             All other systems reviewed and are negative    Objective       Vital Signs Range for the last 24 hours  Temperature: Temp:  [97.6 °F (36.4 °C)-98.8 °F (37.1 °C)] 98.3 °F (36.8 °C)   Temp Source: Temp src: Oral   BP: BP: (123-141)/(76-88) 133/84   Pulse: Heart Rate:  [67-92] 88   Respirations: Resp:  [14-19] 18   SPO2: SpO2:  [97 %-98 %] 98 %   O2 Amount (l/min):     O2 Devices Device (Oxygen Therapy): room air   Weight:         Presentation: vertex   Cervix: Exam by:  HITESH Michaels CNM   Dilation:  4   Effacement: Cervical Effacement: 80   Station:  -1       Fetal Heart Rate Assessment   Method: Fetal HR Assessment Method: external   Beats/min: Fetal HR (beats/min): 115   Baseline: Fetal HR Baseline: normal range   Varibility: Fetal HR Variability: moderate (amplitude range 6 to 25 bpm)   Accels: Fetal HR Accelerations: greater than/equal to 15 bpm   Decels: Fetal HR Decelerations: absent   Tracing Category:  1    NST: REACTIVE     Uterine Assessment   Method: Method: external tocotransducer   Frequency (min): Contraction Frequency (Minutes): 2-3   Ctx Count in 10 min:     Duration:     Intensity: Contraction Intensity: moderate   Intensity by IUPC:     Resting Tone: Uterine Resting Tone: soft by palpation   Resting  Tone by [facility]:     Juice Units:             Constitutional:  Well developed, well nourished, no acute distress, well-groomed.   Respiratory:  Resp e/e/u, normal breath sounds.   Cardiovascular:  Normal rate and rhythm, no murmurs.   Gastrointestinal:  Soft, gravid, nontender.  Uterus: Soft, nontender. Fundus appropriate for dates.  Neurologic:  Alert & oriented x 3,  no focal deficits noted.   Psychiatric:  Speech and behavior appropriate.   Extremities: no cyanosis, clubbing or edema, no evidence of DVT.        Labs:  Lab Results   Component Value Date    WBC 11.21 (H) 01/02/2025    HGB 11.2 (L) 01/02/2025    HCT 32.5 (L) 01/02/2025    MCV 81.3 01/02/2025     01/02/2025    CREATININE 0.37 (L) 10/31/2024    URICACID 3.1 10/31/2024    AST 12 10/31/2024    ALT 7 10/31/2024     10/29/2024     Results from last 7 days   Lab Units 01/02/25  2233 01/02/25 2046   ABO TYPING  A A   RH TYPING  Positive Positive   ANTIBODY SCREEN   --  Negative           Assessment & Plan       39 weeks gestation of pregnancy        Assessment:  1.  Intrauterine pregnancy at 39w5d weeks gestation with reactive fetal status.    2.   induction of labor  for elective  with favorable cervix  3.  Obstetrical history is non-contributory.  4.  GBS status: Positive    Plan:  1.Oxytocin induction and Antibiotics for GBS prophylaxis  2. AROM, clear fluid  3. Plan of care has been reviewed with patient and questions answered.  4.  Risks, benefits of treatment plan have been discussed.  5.  All questions have been answered.        José Manuel Michaels CNM  1/3/2025  08:28 EST

## 2025-01-03 NOTE — PROGRESS NOTES
Called to evaluate bleeding. Patient unable to void. I/O cath performed. 380ml urine output. Several small clots expressed from uterus. No active bleeding from lacerations. No free flow of blood from vaginal. Vaginal tissue bruised and protruding. New ice pack applied. Will continue to monitor.

## 2025-01-04 LAB
BASOPHILS # BLD AUTO: 0.06 10*3/MM3 (ref 0–0.2)
BASOPHILS NFR BLD AUTO: 0.6 % (ref 0–1.5)
DEPRECATED RDW RBC AUTO: 45.1 FL (ref 37–54)
EOSINOPHIL # BLD AUTO: 0.16 10*3/MM3 (ref 0–0.4)
EOSINOPHIL NFR BLD AUTO: 1.5 % (ref 0.3–6.2)
ERYTHROCYTE [DISTWIDTH] IN BLOOD BY AUTOMATED COUNT: 14.5 % (ref 12.3–15.4)
HCT VFR BLD AUTO: 28.3 % (ref 34–46.6)
HGB BLD-MCNC: 9.2 G/DL (ref 12–15.9)
IMM GRANULOCYTES # BLD AUTO: 0.07 10*3/MM3 (ref 0–0.05)
IMM GRANULOCYTES NFR BLD AUTO: 0.7 % (ref 0–0.5)
LYMPHOCYTES # BLD AUTO: 2.91 10*3/MM3 (ref 0.7–3.1)
LYMPHOCYTES NFR BLD AUTO: 27.3 % (ref 19.6–45.3)
MCH RBC QN AUTO: 27.8 PG (ref 26.6–33)
MCHC RBC AUTO-ENTMCNC: 32.5 G/DL (ref 31.5–35.7)
MCV RBC AUTO: 85.5 FL (ref 79–97)
MONOCYTES # BLD AUTO: 0.84 10*3/MM3 (ref 0.1–0.9)
MONOCYTES NFR BLD AUTO: 7.9 % (ref 5–12)
NEUTROPHILS NFR BLD AUTO: 6.62 10*3/MM3 (ref 1.7–7)
NEUTROPHILS NFR BLD AUTO: 62 % (ref 42.7–76)
NRBC BLD AUTO-RTO: 0 /100 WBC (ref 0–0.2)
PLATELET # BLD AUTO: 187 10*3/MM3 (ref 140–450)
PMV BLD AUTO: 11.2 FL (ref 6–12)
RBC # BLD AUTO: 3.31 10*6/MM3 (ref 3.77–5.28)
WBC NRBC COR # BLD AUTO: 10.66 10*3/MM3 (ref 3.4–10.8)

## 2025-01-04 PROCEDURE — 85025 COMPLETE CBC W/AUTO DIFF WBC: CPT | Performed by: ADVANCED PRACTICE MIDWIFE

## 2025-01-04 RX ADMIN — ACETAMINOPHEN 650 MG: 325 TABLET ORAL at 08:39

## 2025-01-04 RX ADMIN — IBUPROFEN 600 MG: 600 TABLET, FILM COATED ORAL at 13:26

## 2025-01-04 RX ADMIN — IBUPROFEN 600 MG: 600 TABLET, FILM COATED ORAL at 20:30

## 2025-01-04 RX ADMIN — IBUPROFEN 600 MG: 600 TABLET, FILM COATED ORAL at 06:17

## 2025-01-04 RX ADMIN — PRENATAL VITAMINS-IRON FUMARATE 27 MG IRON-FOLIC ACID 0.8 MG TABLET 1 TABLET: at 08:24

## 2025-01-04 RX ADMIN — DOCUSATE SODIUM 100 MG: 100 CAPSULE, LIQUID FILLED ORAL at 20:30

## 2025-01-04 RX ADMIN — HYDROCODONE BITARTRATE AND ACETAMINOPHEN 1 TABLET: 10; 325 TABLET ORAL at 04:23

## 2025-01-04 RX ADMIN — HYDROCODONE BITARTRATE AND ACETAMINOPHEN 1 TABLET: 10; 325 TABLET ORAL at 16:07

## 2025-01-04 RX ADMIN — DOCUSATE SODIUM 100 MG: 100 CAPSULE, LIQUID FILLED ORAL at 08:24

## 2025-01-04 NOTE — PROGRESS NOTES
1/4/2025  PPD #1    Subjective   Juliet feels well.  Patient describes her lochia as less than menses.  Pain is well controlled       Objective   Temp: Temp:  [97.5 °F (36.4 °C)-98.3 °F (36.8 °C)] 97.8 °F (36.6 °C) Temp src: Oral   BP: BP: (115-140)/(59-87) 118/66        Pulse: Heart Rate:  [] 69  RR: Resp:  [16-20] 16    General:  No acute distress   Abdomen: Fundus firm and beneath umbilicus   Pelvis: deferred     Lab Results   Component Value Date    WBC 11.21 (H) 01/02/2025    HGB 11.2 (L) 01/02/2025    HCT 32.5 (L) 01/02/2025    MCV 81.3 01/02/2025     01/02/2025    CREATININE 0.37 (L) 10/31/2024    URICACID 3.1 10/31/2024    AST 12 10/31/2024    ALT 7 10/31/2024     10/29/2024     Results from last 7 days   Lab Units 01/02/25  2233 01/02/25 2046   ABO TYPING  A A   RH TYPING  Positive Positive   ANTIBODY SCREEN   --  Negative       Assessment  PPD# 1 after vaginal delivery    Plan  Supportive care, anticipate d/c in am.      This note has been electronically signed.    Unique Farrell CNM  09:41 EST  January 4, 2025

## 2025-01-04 NOTE — ANESTHESIA POSTPROCEDURE EVALUATION
Patient: Juliet Taylor    Procedure Summary       Date: 01/03/25 Room / Location:     Anesthesia Start: 0843 Anesthesia Stop: 1335    Procedure: LABOR ANALGESIA Diagnosis:     Scheduled Providers:  Provider: Rosa Ferrara DO    Anesthesia Type: epidural ASA Status: 2            Anesthesia Type: epidural    Vitals  Vitals Value Taken Time   /66 01/04/25 0905   Temp 97.8 °F (36.6 °C) 01/04/25 0905   Pulse 69 01/04/25 0905   Resp 16 01/04/25 0905   SpO2 98 % 01/03/25 1437           Post Anesthesia Care and Evaluation    Patient location during evaluation: bedside  Patient participation: complete - patient participated  Level of consciousness: awake  Pain score: 0  Pain management: satisfactory to patient    Airway patency: patent  Anesthetic complications: No anesthetic complications  PONV Status: none  Cardiovascular status: acceptable and hemodynamically stable  Respiratory status: acceptable  Hydration status: acceptable  Post Neuraxial Block status: Motor and sensory function returned to baseline and No signs or symptoms of PDPH

## 2025-01-05 VITALS
TEMPERATURE: 98.2 F | DIASTOLIC BLOOD PRESSURE: 80 MMHG | SYSTOLIC BLOOD PRESSURE: 131 MMHG | RESPIRATION RATE: 18 BRPM | OXYGEN SATURATION: 98 % | HEART RATE: 88 BPM

## 2025-01-05 RX ADMIN — HYDROCODONE BITARTRATE AND ACETAMINOPHEN 1 TABLET: 10; 325 TABLET ORAL at 02:32

## 2025-01-05 RX ADMIN — PRENATAL VITAMINS-IRON FUMARATE 27 MG IRON-FOLIC ACID 0.8 MG TABLET 1 TABLET: at 08:31

## 2025-01-05 RX ADMIN — DOCUSATE SODIUM 100 MG: 100 CAPSULE, LIQUID FILLED ORAL at 08:31

## 2025-01-05 RX ADMIN — HYDROCODONE BITARTRATE AND ACETAMINOPHEN 1 TABLET: 10; 325 TABLET ORAL at 08:31

## 2025-01-05 RX ADMIN — IBUPROFEN 600 MG: 600 TABLET, FILM COATED ORAL at 08:31

## 2025-01-05 NOTE — DISCHARGE SUMMARY
[facility]  Vaginal delivery discharge summary      Patient: Juliet Taylor      MR#:[id number]  Admission  Diagnosis: Present on Admission:  **None**     Discharge Diagnosis: Active and Resolved Problems  Active Hospital Problems    Diagnosis  POA     (normal spontaneous vaginal delivery) [O80]  Not Applicable      Resolved Hospital Problems    Diagnosis Date Resolved POA    **39 weeks gestation of pregnancy [Z3A.39] 2025 Not Applicable            Date of Admission: 2025  Date of Discharge:  2025    Procedures:  Vaginal, Spontaneous    1/3/2025   1:30 PM     Service:  Obstetrics    Hospital Course/Assessment:  Patient underwent vaginal delivery and remained in the hospital for 2 days.  During that time she remained afebrile and hemodynamically stable.  On the day of discharge, she was eating, ambulating and voiding without difficulty.  Fundus firm, lochia small, perineum intact. Bottle feeding and infant is doing well.  She is in no acute distress and ready for discharge.    Vitals:    25 0940   BP: 131/80   Pulse: 88   Resp: 18   Temp: 98.2 °F (36.8 °C)   SpO2:          Labs:    Lab Results   Component Value Date    WBC 10.66 2025    HGB 9.2 (L) 2025    HCT 28.3 (L) 2025    MCV 85.5 2025     2025    CREATININE 0.37 (L) 10/31/2024    URICACID 3.1 10/31/2024    AST 12 10/31/2024    ALT 7 10/31/2024     10/29/2024     Results from last 7 days   Lab Units 253 25   ABO TYPING  A A   RH TYPING  Positive Positive   ANTIBODY SCREEN   --  Negative          Discharge Medications        Continue These Medications        Instructions Start Date   busPIRone 10 MG tablet  Commonly known as: BUSPAR   10 mg, 3 Times Daily      ferrous sulfate 325 (65 FE) MG tablet   325 mg, Daily With Breakfast      Prenatal 27-1 27-1 MG tablet tablet   1 tablet, Daily                 Discharge Disposition:  To Home    Discharge Condition:   Stable    Discharge Diet: Regular    Activity at Discharge: Pelvic rest    Follow-up Appointments  Follow up with José Manuel Michaels in 1 weeks for BP check    Uniqeu Farrell CNM  01/05/25  10:39 EST

## 2025-01-06 NOTE — PAYOR COMM NOTE
"Juliet Rodrigues (19 y.o. Female)     From:Aneta Oseguera LPN, Utilization Review  Phone #[phone]  Fax #[phone]    [facility] A#[id number]     Discharged 1/5/25 with Baby.          Date of Birth   2005    Social Security Number       Address   [address] [place] [address]    Home Phone   [phone]    MRN   [id number]       Nondenominational   None    Marital Status                               Admission Date   1/2/25    Admission Type   Elective    Admitting Provider   Ernie Treadwell DO    Attending Provider       Department, Room/Bed   [facility] MOTHER BABY 4A, N421/1       Discharge Date   1/5/2025    Discharge Disposition   Home or Self Care    Discharge Destination                                 Attending Provider: (none)   Allergies: No Known Allergies    Isolation: None   Infection: None   Code Status: Prior    Ht: 160 cm (63\")   Wt: --    Admission Cmt: None   Principal Problem: 39 weeks gestation of pregnancy [Z3A.39]                   Active Insurance as of 1/2/2025       Primary Coverage       Payor Plan Insurance Group Employer/Plan Group    [facility] CLAIMS [facility]        Payor Plan Address Payor Plan Phone Number Payor Plan Fax Number Effective Dates    [address]   7/8/2023 - None Entered    ATTN: NEW CLAIMS       [facility] [address]         Subscriber Name Subscriber Birth Date Member ID       JULIET RODRIGUES 2005 [id number]                     Emergency Contacts        (Rel.) Home Phone Work Phone Mobile Phone    Corby Rodrigues (Spouse) [phone] -- [phone]                 Discharge Summary        Unique Farrell CNM at 01/05/25 1039       Attestation signed by Sol Clarke MD at 01/05/25 1132    I have reviewed this documentation and agree.                  [facility]  Vaginal delivery discharge summary      Patient: Juliet Rodrigues      MR#:[id number]  Admission  " Diagnosis: Present on Admission:  **None**     Discharge Diagnosis: Active and Resolved Problems  Active Hospital Problems    Diagnosis  POA     (normal spontaneous vaginal delivery) [O80]  Not Applicable      Resolved Hospital Problems    Diagnosis Date Resolved POA    **39 weeks gestation of pregnancy [Z3A.39] 2025 Not Applicable            Date of Admission: 2025  Date of Discharge:  2025    Procedures:  Vaginal, Spontaneous    1/3/2025   1:30 PM     Service:  Obstetrics    Hospital Course/Assessment:  Patient underwent vaginal delivery and remained in the hospital for 2 days.  During that time she remained afebrile and hemodynamically stable.  On the day of discharge, she was eating, ambulating and voiding without difficulty.  Fundus firm, lochia small, perineum intact. Bottle feeding and infant is doing well.  She is in no acute distress and ready for discharge.    Vitals:    25 0940   BP: 131/80   Pulse: 88   Resp: 18   Temp: 98.2 °F (36.8 °C)   SpO2:          Labs:    Lab Results   Component Value Date    WBC 10.66 2025    HGB 9.2 (L) 2025    HCT 28.3 (L) 2025    MCV 85.5 2025     2025    CREATININE 0.37 (L) 10/31/2024    URICACID 3.1 10/31/2024    AST 12 10/31/2024    ALT 7 10/31/2024     10/29/2024     Results from last 7 days   Lab Units 25  2233 25   ABO TYPING  A A   RH TYPING  Positive Positive   ANTIBODY SCREEN   --  Negative          Discharge Medications        Continue These Medications        Instructions Start Date   busPIRone 10 MG tablet  Commonly known as: BUSPAR   10 mg, 3 Times Daily      ferrous sulfate 325 (65 FE) MG tablet   325 mg, Daily With Breakfast      Prenatal 27-1 27-1 MG tablet tablet   1 tablet, Daily                 Discharge Disposition:  To Home    Discharge Condition:  Stable    Discharge Diet: Regular    Activity at Discharge: Pelvic rest    Follow-up Appointments  Follow up with José Manuel  Brando in 1 weeks for BP check    Unique Farrell CNM  01/05/25  10:39 EST    Electronically signed by Sol Clarke MD at 01/05/25 3318

## 2025-01-14 ENCOUNTER — MATERNAL SCREENING (OUTPATIENT)
Dept: CALL CENTER | Facility: HOSPITAL | Age: 20
End: 2025-01-14
Payer: OTHER GOVERNMENT

## 2025-01-14 NOTE — OUTREACH NOTE
Maternal Screening Survey      Flowsheet Row Responses   Facility patient discharged from? [facility]   Attempt successful? Yes   Call start time 1312   Call end time 1315   Person spoke with today (if not patient) and relationship patient   I have been able to laugh and see the funny side of things. 0   I have looked forward with enjoyment to things. 0   I have blamed myself unnecessarily when things went wrong. 0   I have been anxious or worried for no good reason. 0   I have felt scared or panicky for no good reason. 0   Things have been getting on top of me. 0   I have been so unhappy that I have had difficulty sleeping. 0   I have felt sad or miserable. 0   I have been so unhappy that I have been crying. 0   The thought of harming myself has occurred to me. 0   [place]  Depression Scale Total 0   Did any of your parents have problems with alcohol or drug use? No   Do any of your peers have problems with alcohol or drug use? No   Does your partner have problems with alcohol or drug use? No   Before you were pregnant did you have problems with alcohol or drug use? (past) No   In the past month, did you drink beer, wine, liquor or use any other drugs? (pregnancy) No   Maternal Screening call completed Yes   Call end time 1315              Dennis MONTANA - Registered Nurse